# Patient Record
Sex: MALE | Race: WHITE | NOT HISPANIC OR LATINO | Employment: OTHER | URBAN - METROPOLITAN AREA
[De-identification: names, ages, dates, MRNs, and addresses within clinical notes are randomized per-mention and may not be internally consistent; named-entity substitution may affect disease eponyms.]

---

## 2018-02-02 ENCOUNTER — TRANSFERRED RECORDS (OUTPATIENT)
Dept: PHYSICAL THERAPY | Facility: CLINIC | Age: 83
End: 2018-02-02

## 2018-02-16 ENCOUNTER — THERAPY VISIT (OUTPATIENT)
Dept: PHYSICAL THERAPY | Facility: CLINIC | Age: 83
End: 2018-02-16
Payer: COMMERCIAL

## 2018-02-16 DIAGNOSIS — M79.604 PAIN OF RIGHT LOWER EXTREMITY: Primary | ICD-10-CM

## 2018-02-16 PROCEDURE — 97162 PT EVAL MOD COMPLEX 30 MIN: CPT | Mod: GP | Performed by: PHYSICAL THERAPIST

## 2018-02-16 PROCEDURE — 97110 THERAPEUTIC EXERCISES: CPT | Mod: GP | Performed by: PHYSICAL THERAPIST

## 2018-02-16 NOTE — PROGRESS NOTES
[facility] Initial Evaluation  Subjective:  Patient is a 83 year old male presenting with rehab back hpi.   Ciro Wells is a 83 year old male with a lumbar condition.      This is a chronic condition  R leg aching and pain which started 2-3 years ago or longer, pt describes that he had a brain tumor 15 years ago, underwent surgical removal, but afterwards he had difficulty with balance and walking.  Over the past year or so it has started to return and he is struggling with walking and ADL's as a result.       Pt had a brain scan last week which was clear.  His MD feels that since he is struggling with walking due to a separate orthopedic issue that is unrelated to his original brain tumor and would like PT to investigate as to a potential orthopedic origin of his symptoms may be possible.      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  .      Radiates to:  Thigh right and lower leg right.  Pain is described as aching and is intermittent and reported as 6/10.   Pain is the same all the time.  Exacerbated by: walking, or WB on R. Relieved by: sitting and leaning back.  Since onset symptoms are unchanged.  Special tests:  CT scan.      General health as reported by patient is fair.        Current medications:  High blood pressure medication.  Current occupation is Retired  .        Barriers include:  None as reported by the patient.    Red flags:  None as reported by the patient.                        Objective:    Gait:  Shuffling    Gait Type:  Antalgic   Assistive Devices:  None  Deviations:  General Deviations:  Arely decr and stride length decr                                                 Hip Evaluation    Hip Strength:      Extension:  Left: 5-/5  Pain:Right: 4+/5    Pain:                               Knee Evaluation:  ROM:            Strength:     Extension:  Left: 5/5   Pain:       Right: 4+/5   Pain:  Flexion:  Left: 5-/5   Pain:      Right: 5-/5   Pain:    Quad Set Left: Good    Pain:   Quad Set Right: Fair    Pain:  Ligament Testing:  Not Assessed                Special Tests: Not Assessed      Palpation:  Normal      Edema:  Normal              Addie Lumbar Evaluation    Posture:  Sitting: fair  Standing: fair    Lateral Shift: no      Movement Loss:  Flexion (Flex): min  Extension (EXT): min  Side Glide R (SG R): min  Side Glide L (SG L): min  Test Movements:    EIS:   Repeat EIS: During: produces  After: no worse  Mechanical Response: no effect            Conclusion: other  Principle of Treatment:          Other: testing posterior derangement, RoF                                       ROS    Assessment/Plan:    Patient is a 83 year old male with right side hip and right side knee complaints.    Patient has the following significant findings with corresponding treatment plan.                Diagnosis 1:  R thigh pain/weakness      Pain -  hot/cold therapy, manual therapy, self management, education, directional preference and home program  Decreased strength - therapeutic exercise and therapeutic activities  Impaired gait - gait training  Impaired muscle performance - neuro re-education  Decreased function - therapeutic activities    Therapy Evaluation Codes:   1) History comprised of:   Personal factors that impact the plan of care:      Age, Past/current experiences and Time since onset of symptoms.    Comorbidity factors that impact the plan of care are:      Weakness and previous brain tumor.     Medications impacting care: not listed.  2) Examination of Body Systems comprised of:   Body structures and functions that impact the plan of care:      Hip and Knee.   Activity limitations that impact the plan of care are:      Bathing, Bending, Driving, Squatting/kneeling, Stairs, Standing and Walking.  3) Clinical presentation characteristics  are:   Unstable/Unpredictable.  4) Decision-Making    Moderate complexity using standardized patient assessment instrument and/or measureable assessment of functional outcome.  Cumulative Therapy Evaluation is: Moderate complexity.    Previous and current functional limitations:  (See Goal Flow Sheet for this information)    Short term and Long term goals: (See Goal Flow Sheet for this information)     Communication ability:  Patient appears to be able to clearly communicate and understand verbal and written communication and follow directions correctly.  Treatment Explanation - The following has been discussed with the patient:   RX ordered/plan of care  Anticipated outcomes  Possible risks and side effects  This patient would benefit from PT intervention to resume normal activities.   Rehab potential is fair.    Frequency:  1 X week, once daily  Duration:  for 6 weeks  Discharge Plan:  Achieve all LTG.  Independent in home treatment program.  Reach maximal therapeutic benefit.    Please refer to the daily flowsheet for treatment today, total treatment time and time spent performing 1:1 timed codes.

## 2018-02-16 NOTE — LETTER
[facility]  [address]  [address]  [place] [address]  [phone]    2018    Re: Ciro Wells   :   1934  MRN:  [id number]   REFERRING PHYSICIAN:   Maximo Shaffer    [facility]    Date of Initial Evaluation:  2018  Visits:  Rxs Used: 1  Reason for Referral:  Pain of right lower extremity    [facility] Initial Evaluation  Subjective:  Patient is a 83 year old male presenting with rehab back hpi.   Ciro Wells is a 83 year old male with a lumbar condition.      This is a chronic condition  R leg aching and pain which started 2-3 years ago or longer, pt describes that he had a brain tumor 15 years ago, underwent surgical removal, but afterwards he had difficulty with balance and walking.  Over the past year or so it has started to return and he is struggling with walking and ADL's as a result.     Pt had a brain scan last week which was clear.  His MD feels that since he is struggling with walking due to a separate orthopedic issue that is unrelated to his original brain tumor and would like PT to investigate as to a potential orthopedic origin of his symptoms may be possible.    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  .      Radiates to:  Thigh right and lower leg right.  Pain is described as aching and is intermittent and reported as 6/10.   Pain is the same all the time.  Exacerbated by: walking, or WB on R. Relieved by: sitting and leaning back.  Since onset symptoms are unchanged.  Special tests:  CT scan.      General health as reported by patient is fair.        Current medications:  High blood pressure medication.  Current occupation is Retired  Barriers include:  None as reported by the patient.  Red flags:  None as reported by the patient.  Objective:  Gait:  Shuffling  Gait  Type:  Antalgic   Assistive Devices:  None  Deviations:  General Deviations:  Arely decr and stride length decr  Hip Evaluation  Hip Strength:    Extension:  Left: 5-/5  Pain:Right: 4+/5    Pain:    Knee Evaluation:  ROM:    Strength:   Extension:  Left: 5/5   Pain:      Right: 4+/5   Pain:  Flexion:  Left: 5-/5   Pain:      Right: 5-/5   Pain:    Quad Set Left: Good    Pain:   Quad Set Right: Fair    Pain:  Ligament Testing:  Not Assessed  Special Tests: Not Assessed  Palpation:  Normal  Edema:  Normal    Re: Ciro Wells   :   1934      Addie Lumbar Evaluation  Posture:  Sitting: fair  Standing: fair  Lateral Shift: no  Movement Loss:  Flexion (Flex): min  Extension (EXT): min  Side Glide R (SG R): min  Side Glide L (SG L): min  Test Movements:  EIS:   Repeat EIS: During: produces  After: no worse  Mechanical Response: no effect  Conclusion: other  Principle of Treatment:  Other: testing posterior derangement, RoF    Assessment/Plan:    Patient is a 83 year old male with right side hip and right side knee complaints.    Patient has the following significant findings with corresponding treatment plan.                Diagnosis 1:  R thigh pain/weakness      Pain -  hot/cold therapy, manual therapy, self management, education, directional preference and home program  Decreased strength - therapeutic exercise and therapeutic activities  Impaired gait - gait training  Impaired muscle performance - neuro re-education  Decreased function - therapeutic activities    Therapy Evaluation Codes:   1) History comprised of:   Personal factors that impact the plan of care:      Age, Past/current experiences and Time since onset of symptoms.    Comorbidity factors that impact the plan of care are:      Weakness and previous brain tumor.     Medications impacting care: not listed.  2) Examination of Body Systems comprised of:   Body structures and functions that impact the plan of care:      Hip and Knee.   Activity  limitations that impact the plan of care are:      Bathing, Bending, Driving, Squatting/kneeling, Stairs, Standing and Walking.  3) Clinical presentation characteristics are:   Unstable/Unpredictable.  4) Decision-Making    Moderate complexity using standardized patient assessment instrument and/or measureable assessment of functional outcome.  Cumulative Therapy Evaluation is: Moderate complexity.    Previous and current functional limitations:  (See Goal Flow Sheet for this information)    Short term and Long term goals: (See Goal Flow Sheet for this information)             Re: Ciro Wells   :   1934    Communication ability:  Patient appears to be able to clearly communicate and understand verbal and written communication and follow directions correctly.  Treatment Explanation - The following has been discussed with the patient:   RX ordered/plan of care  Anticipated outcomes  Possible risks and side effects  This patient would benefit from PT intervention to resume normal activities.   Rehab potential is fair.    Frequency:  1 X week, once daily  Duration:  for 6 weeks  Discharge Plan:  Achieve all LTG.  Independent in home treatment program.  Reach maximal therapeutic benefit.    Thank you for your referral.    INQUIRIES  Therapist: Emerson Day PT   [facility]  [address] [place] [address]  [address] [place] [id number]  Phone: [phone]  Fax: [phone]

## 2018-03-01 ENCOUNTER — THERAPY VISIT (OUTPATIENT)
Dept: PHYSICAL THERAPY | Facility: CLINIC | Age: 83
End: 2018-03-01
Payer: COMMERCIAL

## 2018-03-01 DIAGNOSIS — M79.604 PAIN OF RIGHT LOWER EXTREMITY: ICD-10-CM

## 2018-03-01 PROCEDURE — 97110 THERAPEUTIC EXERCISES: CPT | Mod: GP | Performed by: PHYSICAL THERAPIST

## 2018-03-01 NOTE — MR AVS SNAPSHOT
"              After Visit Summary   3/1/2018    Ciro Wells    MRN: [id number]           Patient Information     Date Of Birth          11/18/1934        Visit Information        Provider Department      3/1/2018 3:20 PM Aristides Bolaños PT [facility] [place]        Today's Diagnoses     Pain of right lower extremity           Follow-ups after your visit        Your next 10 appointments already scheduled     Mar 08, 2018 10:50 AM CST   GEOVANNI Extremity with Emerson Day PT   [facility] [place] (GEOVANNI [facility]  )    [address]  [address]  [place] [address]   [phone]              Who to contact     If you have questions or need follow up information about today's clinic visit or your schedule please contact [facility] [place] directly at [phone].  Normal or non-critical lab and imaging results will be communicated to you by Getourguidehart, letter or phone within 4 business days after the clinic has received the results. If you do not hear from us within 7 days, please contact the clinic through Getourguidehart or phone. If you have a critical or abnormal lab result, we will notify you by phone as soon as possible.  Submit refill requests through Outcome Referrals or call your pharmacy and they will forward the refill request to us. Please allow 3 business days for your refill to be completed.          Additional Information About Your Visit        Getourguidehart Information     Outcome Referrals lets you send messages to your doctor, view your test results, renew your prescriptions, schedule appointments and more. To sign up, go to [email or web address] . Click on \"Log in\" on the left side of the screen, which will take you to the Welcome page. Then click on \"Sign up Now\" on the right side of the page.     You will be asked to enter the access code listed below, as well as some personal information. Please follow the directions to create your username and password.     Your " access code is: [email or web address]  Expires: 2018  4:00 PM     Your access code will  in 90 days. If you need help or a new code, please call your [facility] or [phone].        Care EveryWhere ID     This is your Care EveryWhere ID. This could be used by other organizations to access your [facility] medical records  [id number]         Blood Pressure from Last 3 Encounters:   No data found for BP    Weight from Last 3 Encounters:   No data found for Wt              We Performed the Following     THERAPEUTIC EXERCISES        Primary Care Provider Office Phone # Fax #    Maximo Granados [phone]       [facility] [address] [address]        Equal Access to Services     ALLEGRA NIETO : Hadii aad ku hadasho Soomaali, waaxda luqadaha, qaybta kaalmada adeegyada, waxay cynthiain haymargaritan elaine bautista . So [facility] [phone].    ATENCIÓN: Si habla español, tiene a rogers disposición servicios gratuitos de asistencia lingüística. [facility] [phone].    We comply with applicable federal civil rights laws and [place] laws. We do not discriminate on the basis of race, color, national origin, age, disability, sex, sexual orientation, or gender identity.            Thank you!     Thank you for choosing [facility]  for your care. Our goal is always to provide you with excellent care. Hearing back from our patients is one way we can continue to improve our services. Please take a few minutes to complete the written survey that you may receive in the mail after your visit with us. Thank you!             Your Updated Medication List - Protect others around you: Learn how to safely use, store and throw away your medicines at www.disposemymeds.org.      Notice  As of 3/1/2018  4:00 PM    You have not been prescribed any medications.

## 2018-04-03 PROBLEM — M79.604 PAIN OF RIGHT LOWER EXTREMITY: Status: RESOLVED | Noted: 2018-02-16 | Resolved: 2018-04-03

## 2018-04-03 NOTE — PROGRESS NOTES
Discharge Note    Progress reporting period is from initial eval to Mar 1, 2018.     Ciro failed to return for next follow up visit and current status is unknown.  Please see information below for last relevant information on current status.  Patient seen for 2 visits.  SUBJECTIVE  Subjective changes noted by patient:  Doing ok.  No better or worse.  Strengthening going well.  Stretching was tough due to balance.   .  Current pain level is 4/10.     Previous pain level was  6/10.   Changes in function:  Yes (See Goal flowsheet attached for changes in current functional level)  Adverse reaction to treatment or activity: None    OBJECTIVE  Changes noted in objective findings: Right hip flexion 4+/5, knee extension 4+/5, flexion 5/5, DF 4/5.      ASSESSMENT/PLAN  Diagnosis: R thigh pain/weakness   DIAGP:  The encounter diagnosis was Pain of right lower extremity.  STG/LTGs have been met or progress has been made towards goals:  Yes, please see goal flowsheet for most current information  Assessment of Progress: current status is unknown.    Last current status:     Self Management Plans:  HEP  I have re-evaluated this patient and find that the nature, scope, duration and intensity of the therapy is appropriate for the medical condition of the patient.  Ciro continues to require the following intervention to meet STG and LTG's:  HEP.    Recommendations:  Discharge with current home program.  Patient to follow up with MD as needed.    Please refer to the daily flowsheet for treatment today, total treatment time and time spent performing 1:1 timed codes.

## 2019-08-13 ENCOUNTER — THERAPY VISIT (OUTPATIENT)
Dept: PHYSICAL THERAPY | Facility: CLINIC | Age: 84
End: 2019-08-13
Payer: MEDICARE

## 2019-08-13 DIAGNOSIS — M25.562 ACUTE PAIN OF LEFT KNEE: Primary | ICD-10-CM

## 2019-08-13 PROCEDURE — 97110 THERAPEUTIC EXERCISES: CPT | Mod: GP | Performed by: PHYSICAL THERAPIST

## 2019-08-13 PROCEDURE — 97161 PT EVAL LOW COMPLEX 20 MIN: CPT | Mod: GP | Performed by: PHYSICAL THERAPIST

## 2019-08-13 ASSESSMENT — ACTIVITIES OF DAILY LIVING (ADL)
RAW_SCORE: 53
SQUAT: ACTIVITY IS NOT DIFFICULT
HOW_WOULD_YOU_RATE_THE_CURRENT_FUNCTION_OF_YOUR_KNEE_DURING_YOUR_USUAL_DAILY_ACTIVITIES_ON_A_SCALE_FROM_0_TO_100_WITH_100_BEING_YOUR_LEVEL_OF_KNEE_FUNCTION_PRIOR_TO_YOUR_INJURY_AND_0_BEING_THE_INABILITY_TO_PERFORM_ANY_OF_YOUR_USUAL_DAILY_ACTIVITIES?: 100
RISE FROM A CHAIR: ACTIVITY IS NOT DIFFICULT
STIFFNESS: I DO NOT HAVE THE SYMPTOM
AS_A_RESULT_OF_YOUR_KNEE_INJURY,_HOW_WOULD_YOU_RATE_YOUR_CURRENT_LEVEL_OF_DAILY_ACTIVITY?: SEVERELY ABNORMAL
SWELLING: I DO NOT HAVE THE SYMPTOM
GO DOWN STAIRS: ACTIVITY IS SOMEWHAT DIFFICULT
LIMPING: THE SYMPTOM PREVENTS ME FROM ALL DAILY ACTIVITIES
KNEEL ON THE FRONT OF YOUR KNEE: ACTIVITY IS NOT DIFFICULT
HOW_WOULD_YOU_RATE_THE_OVERALL_FUNCTION_OF_YOUR_KNEE_DURING_YOUR_USUAL_DAILY_ACTIVITIES?: SEVERELY ABNORMAL
GIVING WAY, BUCKLING OR SHIFTING OF KNEE: I DO NOT HAVE THE SYMPTOM
KNEE_ACTIVITY_OF_DAILY_LIVING_SUM: 53
KNEE_ACTIVITY_OF_DAILY_LIVING_SCORE: 75.71
WALK: ACTIVITY IS VERY DIFFICULT
PAIN: THE SYMPTOM AFFECTS MY ACTIVITY SEVERELY
WEAKNESS: I DO NOT HAVE THE SYMPTOM
SIT WITH YOUR KNEE BENT: ACTIVITY IS NOT DIFFICULT
STAND: ACTIVITY IS NOT DIFFICULT
GO UP STAIRS: ACTIVITY IS SOMEWHAT DIFFICULT

## 2019-08-13 NOTE — PROGRESS NOTES
[facility] Initial Evaluation  Subjective:  The history is provided by the patient. No  was used.   Ciro Wells being seen for L leg pain.   Problem began 7/13/2019. Where condition occurred: at home.Problem occurred: Pt was mowing the lawn, and was turning his mower and felt a pull in his L leg.  Date of MD appt 7/18/2019.  Pt denies any previous L knee issues.  Pt isn't sure if it is related to his spine or leg or if it is truly his knee.    and reported as 10/10 on pain scale. General health as reported by patient is excellent. Pertinent medical history includes:  Cancer and high blood pressure.      Current medications:  High blood pressure medication.     Pain is described as aching and is intermittent. Pain is the same all the time. Since onset symptoms are gradually improving. Special tests:  X-ray.     Patient is computer work of various kinds. Restrictions include:  Working in normal job without restrictions.    Barriers include:  None as reported by patient.  Red flags:  None as reported by patient.  Type of problem:  Left knee        Patient reports pain:  Medial. Radiates to:  Gluteals and thigh. Associated symptoms:  Loss of motion/stiffness. Exacerbated by: walking, twisting. Relieved by: sitting.                      Objective:  System                                           Hip Evaluation    Hip Strength:    Flexion:   Left: 4/5   Pain:  Right: 5-/5   Pain:                    Extension:  Left: 4/5  Pain:Right: 4+/5    Pain:                               Knee Evaluation:  ROM:  AROM: normal            Strength:     Extension:  Left: 4+/5   Pain:      Right: 5-/5   Pain:  Flexion:  Left: 4+/5   Pain:      Right: 4+/5   Pain:    Quad Set Left: Good    Pain:   Quad Set Right: Good    Pain:  Ligament Testing:  Normal                Special Tests:   Left knee positive for the following special tests:  Meniscal          Functional Testing:  : struggles with  balance which makes functional testing difficult.                  General     ROS    Assessment/Plan:    Patient is a 84 year old male with left side knee complaints.    Patient has the following significant findings with corresponding treatment plan.                Diagnosis 1:  L knee/thigh pain      Pain -  hot/cold therapy, manual therapy, self management, education and home program  Decreased strength - therapeutic exercise and therapeutic activities  Impaired muscle performance - neuro re-education  Decreased function - therapeutic activities    Therapy Evaluation Codes:   1) History comprised of:   Personal factors that impact the plan of care:      Age.    Comorbidity factors that impact the plan of care are:      Cancer and High blood pressure.     Medications impacting care: High blood pressure.  2) Examination of Body Systems comprised of:   Body structures and functions that impact the plan of care:      Hip and Knee.   Activity limitations that impact the plan of care are:      Walking.  3) Clinical presentation characteristics are:   Evolving/Changing.  4) Decision-Making    Low complexity using standardized patient assessment instrument and/or measureable assessment of functional outcome.  Cumulative Therapy Evaluation is: Low complexity.    Previous and current functional limitations:  (See Goal Flow Sheet for this information)    Short term and Long term goals: (See Goal Flow Sheet for this information)     Communication ability:  Patient appears to be able to clearly communicate and understand verbal and written communication and follow directions correctly.  Treatment Explanation - The following has been discussed with the patient:   RX ordered/plan of care  Anticipated outcomes  Possible risks and side effects  This patient would benefit from PT intervention to resume normal activities.   Rehab potential is good.    Frequency:  1 X week, once daily  Duration:  for 6 weeks  Discharge Plan:   Achieve all LTG.  Independent in home treatment program.  Reach maximal therapeutic benefit.    Please refer to the daily flowsheet for treatment today, total treatment time and time spent performing 1:1 timed codes.

## 2019-08-13 NOTE — LETTER
DEPARTMENT OF HEALTH AND HUMAN SERVICES  CENTERS FOR MEDICARE & MEDICAID SERVICES    PLAN/UPDATED PLAN OF PROGRESS FOR OUTPATIENT REHABILITATION    PATIENTS NAME:  Ciro Wells   : 1934  PROVID[facility] NUMBER:    [id number]  [id number]  PROVIDER NAME: [employer] [facility]  MEDICAL RECORD NUMBER: [id number]   START OF CARE DATE:  SOC Date: 19   TYPE:  PT  PRIMARY/TREATMENT DIAGNOSIS: (Pertinent Medical Diagnosis)  Acute pain of left knee  VISITS FROM START OF CARE: 1 Rxs Used: 1     [place] [facility] Initial Evaluation    Subjective:  The history is provided by the patient. No  was used.   Ciro Wells being seen for L leg pain.   Problem began 2019. Where condition occurred: at home.Problem occurred: Pt was mowing the lawn, and was turning his mower and felt a pull in his L leg.  Date of MD appt 2019.  Pt denies any previous L knee issues.  Pt isn't sure if it is related to his spine or leg or if it is truly his knee.    and reported as 10/10 on pain scale. General health as reported by patient is excellent. Pertinent medical history includes:  Cancer and high blood pressure.      Current medications:  High blood pressure medication.     Pain is described as aching and is intermittent. Pain is the same all the time. Since onset symptoms are gradually improving. Special tests:  X-ray.     Patient is computer work of various kinds. Restrictions include:  Working in normal job without restrictions.  Barriers include:  None as reported by patient.  Red flags:  None as reported by patient.  Type of problem:  Left knee  Patient reports pain:  Medial. Radiates to:  Gluteals and thigh. Associated symptoms:  Loss of motion/stiffness. Exacerbated by: walking, twisting. Relieved by: sitting.  Objective:  System  Hip Evaluation  Hip Strength:    Flexion:   Left: 4/5   Pain:  Right: 5-/5   Pain:  Extension:  Left: 4/5  Pain:Right: 4+/5    Pain:     Knee Evaluation:  ROM:  AROM: normal    Strength:   Extension:  Left: 4+/5   Pain:      Right: 5-/5   Pain:  Flexion:  Left: 4+/5   Pain:      Right: 4+/5   Pain:    Quad Set Left: Good    Pain:   Quad Set Right: Good    Pain:  Ligament Testing:  Normal  Special Tests:   Left knee positive for the following special tests:  Meniscal  Functional Testing:  : struggles with balance which makes functional testing difficult.  Assessment/Plan:    Patient is a 84 year old male with left side knee complaints.    PATIENTS NAME:  Ciro Wells   : 1934    Patient has the following significant findings with corresponding treatment plan.                Diagnosis 1:  L knee/thigh pain  Pain -  hot/cold therapy, manual therapy, self management, education and home program  Decreased strength - therapeutic exercise and therapeutic activities  Impaired muscle performance - neuro re-education  Decreased function - therapeutic activities  Therapy Evaluation Codes:   1) History comprised of:   Personal factors that impact the plan of care:      Age.    Comorbidity factors that impact the plan of care are:      Cancer and High blood pressure.     Medications impacting care: High blood pressure.  2) Examination of Body Systems comprised of:   Body structures and functions that impact the plan of care:      Hip and Knee.   Activity limitations that impact the plan of care are:      Walking.  3) Clinical presentation characteristics are:   Evolving/Changing.  4) Decision-Making    Low complexity using standardized patient assessment instrument and/or measureable assessment of functional outcome.  Cumulative Therapy Evaluation is: Low complexity.  Previous and current functional limitations:  (See Goal Flow Sheet for this information)    Short term and Long term goals: (See Goal Flow Sheet for this information)   Communication ability:  Patient appears to be able to clearly communicate and understand verbal and written  "communication and follow directions correctly.  Treatment Explanation - The following has been discussed with the patient:   RX ordered/plan of care  Anticipated outcomes  Possible risks and side effects  This patient would benefit from PT intervention to resume normal activities.   Rehab potential is good.  Frequency:  1 X week, once daily  Duration:  for 6 weeks  Discharge Plan:  Achieve all LTG.  Independent in home treatment program.  Reach maximal therapeutic benefit.                        PATIENTS NAME:  Ciro Wells   : 1934          Caregiver Signature/Credentials _____________________________ Date ________       CLAUS PinedaT, cert MDT   I have reviewed and certified the need for these services and plan of treatment while under my care.        PHYSICIAN'S SIGNATURE:   _____________________________________ Date___________                            Marimar Martínez MD    Certification period:  Beginning of Cert date period: 19 to  End of Cert period date: 10/11/19   Functional Level Progress Report: Please see attached \"Goal Flow sheet for Functional level.\"  ____X____ Continue Services or       ________ DC Services              Service dates: From  SOC Date: 19 date to present                         "

## 2019-10-11 PROBLEM — M25.562 ACUTE PAIN OF LEFT KNEE: Status: RESOLVED | Noted: 2019-08-13 | Resolved: 2019-10-11

## 2022-03-02 ENCOUNTER — LAB REQUISITION (OUTPATIENT)
Dept: LAB | Facility: CLINIC | Age: 87
End: 2022-03-02
Payer: COMMERCIAL

## 2022-03-02 DIAGNOSIS — Z11.1 ENCOUNTER FOR SCREENING FOR RESPIRATORY TUBERCULOSIS: ICD-10-CM

## 2022-03-02 PROCEDURE — U0005 INFEC AGEN DETEC AMPLI PROBE: HCPCS | Mod: ORL | Performed by: NURSE PRACTITIONER

## 2022-03-03 ENCOUNTER — LAB REQUISITION (OUTPATIENT)
Dept: LAB | Facility: CLINIC | Age: 87
End: 2022-03-03
Payer: COMMERCIAL

## 2022-03-03 ENCOUNTER — TRANSITIONAL CARE UNIT VISIT (OUTPATIENT)
Dept: GERIATRICS | Facility: CLINIC | Age: 87
End: 2022-03-03
Payer: COMMERCIAL

## 2022-03-03 VITALS
SYSTOLIC BLOOD PRESSURE: 138 MMHG | TEMPERATURE: 97.9 F | HEART RATE: 88 BPM | BODY MASS INDEX: 31.94 KG/M2 | HEIGHT: 73 IN | WEIGHT: 241 LBS | OXYGEN SATURATION: 94 % | RESPIRATION RATE: 20 BRPM | DIASTOLIC BLOOD PRESSURE: 66 MMHG

## 2022-03-03 VITALS
RESPIRATION RATE: 18 BRPM | TEMPERATURE: 98.3 F | SYSTOLIC BLOOD PRESSURE: 138 MMHG | DIASTOLIC BLOOD PRESSURE: 74 MMHG | WEIGHT: 241 LBS | HEART RATE: 63 BPM | OXYGEN SATURATION: 95 %

## 2022-03-03 DIAGNOSIS — U07.1 COVID-19: ICD-10-CM

## 2022-03-03 DIAGNOSIS — J96.01 ACUTE RESPIRATORY FAILURE WITH HYPOXIA (H): ICD-10-CM

## 2022-03-03 DIAGNOSIS — S72.001D CLOSED FRACTURE OF RIGHT HIP WITH ROUTINE HEALING, SUBSEQUENT ENCOUNTER: ICD-10-CM

## 2022-03-03 DIAGNOSIS — D64.9 POSTOPERATIVE ANEMIA: ICD-10-CM

## 2022-03-03 DIAGNOSIS — I49.8 ATRIAL ARRHYTHMIA: ICD-10-CM

## 2022-03-03 DIAGNOSIS — I50.21 ACUTE HFREF (HEART FAILURE WITH REDUCED EJECTION FRACTION) (H): ICD-10-CM

## 2022-03-03 DIAGNOSIS — I10 ESSENTIAL HYPERTENSION: ICD-10-CM

## 2022-03-03 DIAGNOSIS — Z86.718 HISTORY OF DEEP VENOUS THROMBOSIS: ICD-10-CM

## 2022-03-03 DIAGNOSIS — I42.9 CARDIOMYOPATHY, UNSPECIFIED TYPE (H): ICD-10-CM

## 2022-03-03 DIAGNOSIS — J69.0 ASPIRATION PNEUMONIA, UNSPECIFIED ASPIRATION PNEUMONIA TYPE, UNSPECIFIED LATERALITY, UNSPECIFIED PART OF LUNG (H): ICD-10-CM

## 2022-03-03 DIAGNOSIS — I27.20 PULMONARY HYPERTENSION (H): ICD-10-CM

## 2022-03-03 DIAGNOSIS — I21.4 NSTEMI (NON-ST ELEVATED MYOCARDIAL INFARCTION) (H): Primary | ICD-10-CM

## 2022-03-03 DIAGNOSIS — R53.81 PHYSICAL DECONDITIONING: ICD-10-CM

## 2022-03-03 DIAGNOSIS — N18.30 STAGE 3 CHRONIC KIDNEY DISEASE, UNSPECIFIED WHETHER STAGE 3A OR 3B CKD (H): ICD-10-CM

## 2022-03-03 DIAGNOSIS — I25.10 CORONARY ARTERY DISEASE INVOLVING NATIVE CORONARY ARTERY OF NATIVE HEART WITHOUT ANGINA PECTORIS: ICD-10-CM

## 2022-03-03 DIAGNOSIS — Z79.01 CHRONIC ANTICOAGULATION: ICD-10-CM

## 2022-03-03 PROCEDURE — 99310 SBSQ NF CARE HIGH MDM 45: CPT | Performed by: NURSE PRACTITIONER

## 2022-03-03 PROCEDURE — 36415 COLL VENOUS BLD VENIPUNCTURE: CPT | Mod: ORL | Performed by: NURSE PRACTITIONER

## 2022-03-03 PROCEDURE — U0005 INFEC AGEN DETEC AMPLI PROBE: HCPCS | Performed by: NURSE PRACTITIONER

## 2022-03-03 PROCEDURE — 86481 TB AG RESPONSE T-CELL SUSP: CPT | Mod: ORL | Performed by: NURSE PRACTITIONER

## 2022-03-03 RX ORDER — WARFARIN SODIUM 5 MG/1
5 TABLET ORAL AT BEDTIME
COMMUNITY
Start: 2021-06-21 | End: 2022-03-07

## 2022-03-03 RX ORDER — FUROSEMIDE 20 MG
40 TABLET ORAL DAILY
COMMUNITY
Start: 2022-03-01 | End: 2023-03-01

## 2022-03-03 RX ORDER — TRIAMCINOLONE ACETONIDE 1 MG/G
CREAM TOPICAL SEE ADMIN INSTRUCTIONS
COMMUNITY
Start: 2020-06-29 | End: 2023-02-22

## 2022-03-03 RX ORDER — METOPROLOL SUCCINATE 100 MG/1
50 TABLET, EXTENDED RELEASE ORAL DAILY
COMMUNITY
Start: 2022-03-02 | End: 2023-02-22

## 2022-03-03 RX ORDER — ACETAMINOPHEN 325 MG/1
650 TABLET ORAL EVERY 4 HOURS PRN
COMMUNITY
Start: 2021-08-12 | End: 2023-02-22

## 2022-03-03 NOTE — PATIENT INSTRUCTIONS
Orders  Ciro Wells  11/18/1934  1) Patient should follow up with ortho as scheduled on 3/7- please ensure details are entered in PCC  2) Patient also to follow up with cardiology- Dr Childs through [facility] system. Please add to PCC and help schedule  3) Please ensure patient has code status added. Discharge orders state he is full code  4) BMP, CBC 3/4. Diagnosis: anemia, CKD3  5) IS 10 reps QID with assist.  6) Daily weight. Update provider if weight gain >2 lb in 1 day, >5 lb in 1 week.  LORRAINE Che CNP on 3/3/2022 at 2:58 PM

## 2022-03-03 NOTE — LETTER
3/3/2022        RE: Ciro Wells  [address]  [place] [address]        [facility]    PRIMARY CARE PROVIDER AND CLINIC:  Maximo Granados MD, [facility] [address] [facility] [address]  Chief Complaint   Patient presents with     Hospital F/U      [facility] Record Number:  [id number]  Place of Service where encounter took place:  [facility]  ([facility]) [[id number]]    Ciro Wells  is a 87 year old  (11/18/1934), admitted to the above facility from  [facility] . Hospital stay 2/19/22 through 3/2/22..   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    Summary of recent hospitalization:  Ciro Wells was hospitalized at [facility] from February 19 through March 2, 2022.  He presented to the emergency department via EMS after he was found down on the ground in his garage for about 5 hours. Upon presentation to ED he was found to have elevated WBC 19.7, elevated lactate 2.4, tachycardia 120-130s on admission, elevated troponin 1.42-->2.51-->30.92. BNP elevated to 2534 on 2/21. CTA of chest revealed no PE, interlobular septal thickening and groundglass alveolar opacity affecting the right lung much more than the left. Trace right pleural effusion. Findings are likely on the basis of asymmetric edema and possible superimposed aspiration pneumonitis on the right. Step and legionella urine AG were negative. Blood cultures were negative. He was treated for sepsis secondary to aspiration pneumonia with ceftriaxone and azithromycin. He did require oxygen which was weaned off prior to hospital discharge. Cardiology consulted on admission due to elevated troponin, tachycardia. ECHO 2/20/22 showed EF severely reduced to 25%, cannot comment on WMA, mildly increased left ventricular  "wall thickness, RV is suboptimally visualized (grossly normal size with reduced function, left atrial chamber dimension is mildly enlarged, trace mitral valve regurgitation, moderate tricuspid valve regurgitation, Mild pulmonary hypertension, proximal ascending aorta is borderline dilated. Repeat ECHO 2/2022 with improved EF 40-45%, which is improved from previous, distal inferior and apical walls hypokinetic, which has has improved from previous. Underwent coronary angiogram 2/22 that showed Minimal non-obstructive CAD, Possible stress cardiomyopathy variant, type II NSTEMI in setting of recent fall / hip fracture. Started on metoprolol ER, lasix dose was adjusted. PTA lisinopril and chlorthalidone were discontinued. Follow up with cardiology outpatient. Ortho was also consulted on admission. Xray of pelvis revealed \"Oblique impacted fracture involving the right femoral neck. The right femoral shaft is retracted proximally and impacted upon the right femoral neck. No dislocation of the femoral/acetabular articulation which demonstrates moderate degenerative change. Moderate to degenerative changes left hip. Degenerative changes both SI joints. Surgical clips in the pelvis\" and he is status post posterior hip placement on February 21. He was started on vitamin d supplementation (vitamin d level low at 24). Should follow up with ortho on 3/7 for incision check. Discharged to TCU for physical rehabilitation and medical management.     Quinten was seen today sitting up in wheelchair in his room. Reports no pain at rest. Pain increases to 5/10 with movement and therapy. We discussed scheduling tylenol and at this time declines this. He is hoping to discharge home soon. Therapy reports he is currently assist of 2 for transfers, cares. He denies any SOB, cough, CP, dizziness. Reports history of swelling to left leg> right. Has some edema today. He had many questions about his medications from hospital stay and we discussed " this and I asked nursing to give him a copy of his medication list. He reports bowels moving well. Appetite is ok. Denies dysuria, trouble voiding. We discussed what to expect at TCU and I encouraged him to continue to work with therapy to ensure he is safe and successful when he discharges home. INR today elevated to 4.9.    CODE STATUS/ADVANCE DIRECTIVES DISCUSSION:  Full Code  CPR/Full code   ALLERGIES:   Allergies   Allergen Reactions     Other Environmental Allergy      Pollen, Dust      PAST MEDICAL HISTORY: No past medical history on file.   PAST SURGICAL HISTORY:   has no past surgical history on file.  FAMILY HISTORY: family history is not on file.  SOCIAL HISTORY:     Patient's living condition: lives with spouse    Post Discharge Medication Reconciliation Status: discharge medications reconciled and changed, per note/orders  Current Outpatient Medications   Medication Sig     acetaminophen (TYLENOL) 325 MG tablet Take 650 mg by mouth every 4 hours as needed     cholecalciferol 25 MCG (1000 UT) TABS Take 1,000 Units by mouth daily     furosemide (LASIX) 20 MG tablet Take 20 mg by mouth daily     metoprolol succinate ER (TOPROL-XL) 100 MG 24 hr tablet Take 50 mg by mouth daily     Multiple Vitamins-Minerals (CENTRUM SILVER 50+MEN PO) Take 1 tablet by mouth daily     triamcinolone (KENALOG) 0.1 % external cream Apply topically See Admin Instructions Apply to calves topically as needed for dermalitis apply to scaly pink areas on calves 1-2 x daily until smooth as needed     warfarin ANTICOAGULANT (COUMADIN) 5 MG tablet Take 5 mg by mouth At Bedtime     No current facility-administered medications for this visit.       ROS:  10 point ROS of systems including Constitutional, Eyes, Respiratory, Cardiovascular, Gastroenterology, Genitourinary, Integumentary, Musculoskeletal, Psychiatric were all negative except for pertinent positives noted in my HPI.    Vitals:  /74   Pulse 63   Temp 98.3  F (36.8  C)    Resp 18   Wt 109.3 kg (241 lb)   SpO2 95%   Exam:  GENERAL APPEARANCE:  Alert, in NAD  HEENT: normocephalic, moist mucous membranes, nose without drainage or crusting  RESP:  respiratory effort normal, no respiratory distress, Lung sounds clear, patient is on RA  CV: auscultation of heart done, rate and rhythm irregular.  ABDOMEN: + bowel sounds, soft, nontender, no grimacing or guarding with palpation.  M/S:+1 edema to RLE, generalized edema to LLE; good sensation to feet, + pedal pulses  SKIN:  Inspection and palpation of skin and subcutaneous tissue: skin warm, dry without rashes  NEURO: cranial nerves 2-12 grossly intact and at patient's baseline; moves extremities freely  PSYCH: oriented x 3, affect and mood normal    Lab/Diagnostic data:  Labs done in SNF are in [facility]. Please refer to them using [facility]Care Everywhere. and Recent labs in EPIC reviewed by me today.     ASSESSMENT/PLAN:  (I21.4) NSTEMI (non-ST elevated myocardial infarction) (H)  (primary encounter diagnosis)  (I50.21) Acute HFrEF (heart failure with reduced ejection fraction) (H)  (I42.9) Cardiomyopathy, unspecified type (H)  (I27.20) Pulmonary hypertension (H)  (I25.10) Coronary artery disease involving native coronary artery of native heart without angina pectoris  (I10) Essential hypertension  Comment: Found to have elevated troponin on admission to hospital elevated troponin 1.42-->2.51-->30.92  -cardiology consulted   -ECHO 2/20/22 showed EF severely reduced to 25%, cannot comment on WMA, mildly increased left ventricular wall thickness, RV is suboptimally visualized (grossly normal size with reduced function, left atrial chamber dimension is mildly enlarged, trace mitral valve regurgitation, moderate tricuspid valve regurgitation, Mild pulmonary hypertension, proximal ascending aorta is borderline dilated.   -Repeat ECHO 2/22/2022 with improved EF 40-45%, which is improved from previous, distal inferior and apical walls  hypokinetic, which has has improved from previous.   -Underwent coronary angiogram 2/22 that showed Minimal non-obstructive CAD, Possible stress cardiomyopathy variant, type II NSTEMI in setting of recent fall / hip fracture.   -denies CP today.   -appears euvolemic- some edema to legs that is chronic and not worse than normal and denies respiratory symptoms  -BP controlled 138/66, 138/74, 141/78  -PTA lisinopril and chlorthalidone discontinued in hospital, started on metoprolol ER and lasix  -was not started on aspirin or statin during hospitalization  Plan: Continue lasix 20 mg daily, metoprolol ER 50 mg daily. BMP 3/4. TG shapes on in AM, off in PM. VS per policy. Adjust medications as needed. Daily weights. Notify provider with weight gain >2 lbs in a day, >5 lbs in on week. 2 gram Na diet. Follow up with cardiology Dr. Childs through [facility]-asked facility to schedule.     (I49.8) Atrial arrhythmia s/p ablation 8/30/2019  (Z86.718) History of deep venous thrombosis  History of PE  (Z79.01) Chronic anticoagulation  Comment: HR tachycardic on admission to hospital  -CTA on admission negative for PE  -metoprolol XL started in hospital  -INR supratherapeutic today 4.9  -HR at TCU controlled 63, 93, 89  Plan: Continue metoprolol ER 50 mg daily.VS per policy. Adjust medications as needed. Follow up with cardiology.    (J69.0) Aspiration pneumonia, unspecified aspiration pneumonia type, unspecified laterality, unspecified part of lung (H)  (J96.01) Acute respiratory failure with hypoxia (H)  Comment: found to be septic on admission to hospital WBC 19.7, elevated lactate 2.4.  -CTA of chest revealed no PE, interlobular septal thickening and groundglass alveolar opacity affecting the right lung much more than the left. Trace right pleural effusion. Findings are likely on the basis of asymmetric edema and possible superimposed aspiration pneumonitis on the right.   -Step and legionella urine AG were negative.   -Blood  cultures were negative.   -He was treated for sepsis secondary to aspiration pneumonia with ceftriaxone and azithromycin.   -Oxygen was weaned off during hospitalization  -is afebrile and hemodynamically stable. Denies SOB, cough, coughing while eating  Plan: IS 10 reps QID with assist. Monitor respiratory status.    (N18.30) Stage 3 chronic kidney disease, unspecified whether stage 3a or 3b CKD (H)  Comment: with KEM during hospitalization suspected to be secondary to intraoperative hypotension, contrast from CTA, possible congestion from CHF  -creatinine improving at hospital discharge was 1.28 on 2/27  -baseline creatinine around 1.2  Plan: BMP tomorrow. Avoid nephrotoxins. Renally dose medications as indicated.    (S72.001D) Closed fracture of right hip with routine healing, subsequent encounter  Comment: s/p posterior hip placement on February 21  -pain currently managed   Plan: Continue tylenol PRN, oxycodone PRN. Continue warfarin for DVT prophy. Continue vitamin d supplementation. WBAT. Therapy as below. Follow up with ortho on 3/7. Follow up with osteoporosis clinic outpatient.     Post op anemia  Comment: Hgb at last check on 2/24 was 8.1, down for admission hgb of 13.5  -no s.sx of bleeding today  Plan: CBC 3/4.     (R53.81) Physical deconditioning  Comment: Acute, secondary to recent hospitalization, medical conditions as above  -patient hoping to discharge home soon. Discussed with therapy today and patient requiring assist of 2 for cares, transfers.   Plan: Encourage participation in physical therapy/occupational therapy for strengthening and deconditioning. Discharge planning per their recommendation. Social work to assist with d/c planning.        Total time spent with patient visit at the skilled nursing facility was 43 minutes including patient visit and review of past records. Greater than 50% of total time spent with counseling and coordinating care due to coordinating care with facility staff  regarding admission orders, medication orders, plan of care as described above. Counseling patient: current medications, plan of care and what to expect at TCU, potential discharge plan  .     Electronically signed by:  LORRAINE Che CNP                       Sincerely,        LORRAINE Che CNP

## 2022-03-03 NOTE — PROGRESS NOTES
[facility] GERIATRICS    PRIMARY CARE PROVIDER AND CLINIC:  Maximo Granados MD, [address] [address]  Chief Complaint   Patient presents with     Hospital F/U      [place] Medical Record Number:  [id number]  Place of Service where encounter took place:  [facility]  ([facility]) [[id number]]    Ciro Wells  is a 87 year old  (11/18/1934), admitted to the above facility from  [facility] . Hospital stay 2/19/22 through 3/2/22..   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    Summary of recent hospitalization:  Ciro Wells was hospitalized at [facility] from February 19 through March 2, 2022.  He presented to the emergency department via EMS after he was found down on the ground in his garage for about 5 hours. Upon presentation to ED he was found to have elevated WBC 19.7, elevated lactate 2.4, tachycardia 120-130s on admission, elevated troponin 1.42-->2.51-->30.92. BNP elevated to 2534 on 2/21. CTA of chest revealed no PE, interlobular septal thickening and groundglass alveolar opacity affecting the right lung much more than the left. Trace right pleural effusion. Findings are likely on the basis of asymmetric edema and possible superimposed aspiration pneumonitis on the right. Step and legionella urine AG were negative. Blood cultures were negative. He was treated for sepsis secondary to aspiration pneumonia with ceftriaxone and azithromycin. He did require oxygen which was weaned off prior to hospital discharge. Cardiology consulted on admission due to elevated troponin, tachycardia. ECHO 2/20/22 showed EF severely reduced to 25%, cannot comment on WMA, mildly increased left ventricular wall thickness, RV is suboptimally visualized (grossly normal size with reduced  "function, left atrial chamber dimension is mildly enlarged, trace mitral valve regurgitation, moderate tricuspid valve regurgitation, Mild pulmonary hypertension, proximal ascending aorta is borderline dilated. Repeat ECHO 2/2022 with improved EF 40-45%, which is improved from previous, distal inferior and apical walls hypokinetic, which has has improved from previous. Underwent coronary angiogram 2/22 that showed Minimal non-obstructive CAD, Possible stress cardiomyopathy variant, type II NSTEMI in setting of recent fall / hip fracture. Started on metoprolol ER, lasix dose was adjusted. PTA lisinopril and chlorthalidone were discontinued. Follow up with cardiology outpatient. Ortho was also consulted on admission. Xray of pelvis revealed \"Oblique impacted fracture involving the right femoral neck. The right femoral shaft is retracted proximally and impacted upon the right femoral neck. No dislocation of the femoral/acetabular articulation which demonstrates moderate degenerative change. Moderate to degenerative changes left hip. Degenerative changes both SI joints. Surgical clips in the pelvis\" and he is status post posterior hip placement on February 21. He was started on vitamin d supplementation (vitamin d level low at 24). Should follow up with ortho on 3/7 for incision check. Discharged to TCU for physical rehabilitation and medical management.     Quinten was seen today sitting up in wheelchair in his room. Reports no pain at rest. Pain increases to 5/10 with movement and therapy. We discussed scheduling tylenol and at this time declines this. He is hoping to discharge home soon. Therapy reports he is currently assist of 2 for transfers, cares. He denies any SOB, cough, CP, dizziness. Reports history of swelling to left leg> right. Has some edema today. He had many questions about his medications from hospital stay and we discussed this and I asked nursing to give him a copy of his medication list. He reports " bowels moving well. Appetite is ok. Denies dysuria, trouble voiding. We discussed what to expect at TCU and I encouraged him to continue to work with therapy to ensure he is safe and successful when he discharges home. INR today elevated to 4.9.    CODE STATUS/ADVANCE DIRECTIVES DISCUSSION:  Full Code  CPR/Full code   ALLERGIES:   Allergies   Allergen Reactions     Other Environmental Allergy      Pollen, Dust      PAST MEDICAL HISTORY: No past medical history on file.   PAST SURGICAL HISTORY:   has no past surgical history on file.  FAMILY HISTORY: family history is not on file.  SOCIAL HISTORY:     Patient's living condition: lives with spouse    Post Discharge Medication Reconciliation Status: discharge medications reconciled and changed, per note/orders  Current Outpatient Medications   Medication Sig     acetaminophen (TYLENOL) 325 MG tablet Take 650 mg by mouth every 4 hours as needed     cholecalciferol 25 MCG (1000 UT) TABS Take 1,000 Units by mouth daily     furosemide (LASIX) 20 MG tablet Take 20 mg by mouth daily     metoprolol succinate ER (TOPROL-XL) 100 MG 24 hr tablet Take 50 mg by mouth daily     Multiple Vitamins-Minerals (CENTRUM SILVER 50+MEN PO) Take 1 tablet by mouth daily     triamcinolone (KENALOG) 0.1 % external cream Apply topically See Admin Instructions Apply to calves topically as needed for dermalitis apply to scaly pink areas on calves 1-2 x daily until smooth as needed     warfarin ANTICOAGULANT (COUMADIN) 5 MG tablet Take 5 mg by mouth At Bedtime     No current facility-administered medications for this visit.       ROS:  10 point ROS of systems including Constitutional, Eyes, Respiratory, Cardiovascular, Gastroenterology, Genitourinary, Integumentary, Musculoskeletal, Psychiatric were all negative except for pertinent positives noted in my HPI.    Vitals:  /74   Pulse 63   Temp 98.3  F (36.8  C)   Resp 18   Wt 109.3 kg (241 lb)   SpO2 95%   Exam:  GENERAL APPEARANCE:   Alert, in NAD  HEENT: normocephalic, moist mucous membranes, nose without drainage or crusting  RESP:  respiratory effort normal, no respiratory distress, Lung sounds clear, patient is on RA  CV: auscultation of heart done, rate and rhythm irregular.  ABDOMEN: + bowel sounds, soft, nontender, no grimacing or guarding with palpation.  M/S:+1 edema to RLE, generalized edema to LLE; good sensation to feet, + pedal pulses  SKIN:  Inspection and palpation of skin and subcutaneous tissue: skin warm, dry without rashes  NEURO: cranial nerves 2-12 grossly intact and at patient's baseline; moves extremities freely  PSYCH: oriented x 3, affect and mood normal    Lab/Diagnostic data:  Labs done in SNF are in [place] [employer]. Please refer to them using [employer] Everywhere. and Recent labs in EPIC reviewed by me today.     ASSESSMENT/PLAN:  (I21.4) NSTEMI (non-ST elevated myocardial infarction) (H)  (primary encounter diagnosis)  (I50.21) Acute HFrEF (heart failure with reduced ejection fraction) (H)  (I42.9) Cardiomyopathy, unspecified type (H)  (I27.20) Pulmonary hypertension (H)  (I25.10) Coronary artery disease involving native coronary artery of native heart without angina pectoris  (I10) Essential hypertension  Comment: Found to have elevated troponin on admission to hospital elevated troponin 1.42-->2.51-->30.92  -cardiology consulted   -ECHO 2/20/22 showed EF severely reduced to 25%, cannot comment on WMA, mildly increased left ventricular wall thickness, RV is suboptimally visualized (grossly normal size with reduced function, left atrial chamber dimension is mildly enlarged, trace mitral valve regurgitation, moderate tricuspid valve regurgitation, Mild pulmonary hypertension, proximal ascending aorta is borderline dilated.   -Repeat ECHO 2/22/2022 with improved EF 40-45%, which is improved from previous, distal inferior and apical walls hypokinetic, which has has improved from previous.   -Underwent coronary angiogram  2/22 that showed Minimal non-obstructive CAD, Possible stress cardiomyopathy variant, type II NSTEMI in setting of recent fall / hip fracture.   -denies CP today.   -appears euvolemic- some edema to legs that is chronic and not worse than normal and denies respiratory symptoms  -BP controlled 138/66, 138/74, 141/78  -PTA lisinopril and chlorthalidone discontinued in hospital, started on metoprolol ER and lasix  -was not started on aspirin or statin during hospitalization  Plan: Continue lasix 20 mg daily, metoprolol ER 50 mg daily. BMP 3/4. TG shapes on in AM, off in PM. VS per policy. Adjust medications as needed. Daily weights. Notify provider with weight gain >2 lbs in a day, >5 lbs in on week. 2 gram Na diet. Follow up with cardiology Dr. Childs through [facility]-asked facility to schedule.     (I49.8) Atrial arrhythmia s/p ablation 8/30/2019  (Z86.718) History of deep venous thrombosis  History of PE  (Z79.01) Chronic anticoagulation  Comment: HR tachycardic on admission to hospital  -CTA on admission negative for PE  -metoprolol XL started in hospital  -INR supratherapeutic today 4.9  -HR at TCU controlled 63, 93, 89  Plan: Continue metoprolol ER 50 mg daily.VS per policy. Adjust medications as needed. Follow up with cardiology.    (J69.0) Aspiration pneumonia, unspecified aspiration pneumonia type, unspecified laterality, unspecified part of lung (H)  (J96.01) Acute respiratory failure with hypoxia (H)  Comment: found to be septic on admission to hospital WBC 19.7, elevated lactate 2.4.  -CTA of chest revealed no PE, interlobular septal thickening and groundglass alveolar opacity affecting the right lung much more than the left. Trace right pleural effusion. Findings are likely on the basis of asymmetric edema and possible superimposed aspiration pneumonitis on the right.   -Step and legionella urine AG were negative.   -Blood cultures were negative.   -He was treated for sepsis secondary to aspiration pneumonia  with ceftriaxone and azithromycin.   -Oxygen was weaned off during hospitalization  -is afebrile and hemodynamically stable. Denies SOB, cough, coughing while eating  Plan: IS 10 reps QID with assist. Monitor respiratory status.    (N18.30) Stage 3 chronic kidney disease, unspecified whether stage 3a or 3b CKD (H)  Comment: with KEM during hospitalization suspected to be secondary to intraoperative hypotension, contrast from CTA, possible congestion from CHF  -creatinine improving at hospital discharge was 1.28 on 2/27  -baseline creatinine around 1.2  Plan: BMP tomorrow. Avoid nephrotoxins. Renally dose medications as indicated.    (S72.001D) Closed fracture of right hip with routine healing, subsequent encounter  Comment: s/p posterior hip placement on February 21  -pain currently managed   Plan: Continue tylenol PRN, oxycodone PRN. Continue warfarin for DVT prophy. Continue vitamin d supplementation. WBAT. Therapy as below. Follow up with ortho on 3/7. Follow up with osteoporosis clinic outpatient.     Post op anemia  Comment: Hgb at last check on 2/24 was 8.1, down for admission hgb of 13.5  -no s.sx of bleeding today  Plan: CBC 3/4.     (R53.81) Physical deconditioning  Comment: Acute, secondary to recent hospitalization, medical conditions as above  -patient hoping to discharge home soon. Discussed with therapy today and patient requiring assist of 2 for cares, transfers.   Plan: Encourage participation in physical therapy/occupational therapy for strengthening and deconditioning. Discharge planning per their recommendation. Social work to assist with d/c planning.        Total time spent with patient visit at the skilled nursing facility was 43 minutes including patient visit and review of past records. Greater than 50% of total time spent with counseling and coordinating care due to coordinating care with facility staff regarding admission orders, medication orders, plan of care as described above.  Counseling patient: current medications, plan of care and what to expect at TCU, potential discharge plan  .     Electronically signed by:  LORRAINE Che CNP

## 2022-03-03 NOTE — PROGRESS NOTES
"[facility] GERIATRICS  [place] Medical Record Number:  [id number]  Place of Service where encounter took place: [facility]  ([facility]) [[id number]]    HPI:    Ciro Wells is a 87 year old  (11/18/1934), who is being seen today for an episodic care visit at the above location. Today's concern is INR/Coumadin management for atrial flutter, history of PE, DVT    ROS/Subjective:  Bleeding Signs/Symptoms:  None  Thromboembolic Signs/Symptoms:  None  Medication Changes: Yes, medications adjusted during recent hospitalization  Dietary Changes:  Yes now in TCU  Activity Changes: Yes: now in TCU  Bacterial/Viral Infection:  recent sepsis secondary to aspiration pnuemonia, completed antibiotics in the hospital  Missed Coumadin Doses:  INR elevated yesterday held at minimum 3/2 and 3/3  On ASA: No  Other Concerns:  Patient requesting discharge home today. Spoke to therapy who reports patient is assist of 2 for transfers, ambulating 15 feet with assist of 2, not safe for discharge home.    OBJECTIVE:  /66   Pulse 88   Temp 97.9  F (36.6  C)   Resp 20   Ht 1.854 m (6' 0.99\")   Wt 109.3 kg (241 lb)   SpO2 94%   BMI 31.80 kg/m    Last INR: 4.9 on 3/3/22  INR Today:  3.2  Current Dose:  Coumadin held, PTA dosing was 5 mg daily per hospital discharge orders  INR Flow sheet at SNF:    ASSESSMENT:  1. Encounter for therapeutic drug monitoring    2. Long term (current) use of anticoagulants    3. Atrial flutter, unspecified type (H)    4. History of deep venous thrombosis    5. History of pulmonary embolism      Supratherapeutic INR for goal of 2-3    PLAN/ORDERS:     New Dose: Coumadin 2 mg today    Next INR: INR tomorrow      Discussed patient wanting to discharge home with therapy as above who states discharge at this time is not safe due to requiring assist of 2. Update  who will reach out to family to discuss.     Electronically signed by:  LORRAINE Che CNP   "

## 2022-03-04 ENCOUNTER — TRANSITIONAL CARE UNIT VISIT (OUTPATIENT)
Dept: GERIATRICS | Facility: CLINIC | Age: 87
End: 2022-03-04
Payer: COMMERCIAL

## 2022-03-04 DIAGNOSIS — Z51.81 ENCOUNTER FOR THERAPEUTIC DRUG MONITORING: Primary | ICD-10-CM

## 2022-03-04 DIAGNOSIS — Z86.718 HISTORY OF DEEP VENOUS THROMBOSIS: ICD-10-CM

## 2022-03-04 DIAGNOSIS — Z79.01 LONG TERM (CURRENT) USE OF ANTICOAGULANTS: ICD-10-CM

## 2022-03-04 DIAGNOSIS — I48.92 ATRIAL FLUTTER, UNSPECIFIED TYPE (H): ICD-10-CM

## 2022-03-04 DIAGNOSIS — Z86.711 HISTORY OF PULMONARY EMBOLISM: ICD-10-CM

## 2022-03-04 LAB
GAMMA INTERFERON BACKGROUND BLD IA-ACNC: 0.06 IU/ML
M TB IFN-G BLD-IMP: NEGATIVE
M TB IFN-G CD4+ BCKGRND COR BLD-ACNC: 1.26 IU/ML
MITOGEN IGNF BCKGRD COR BLD-ACNC: 0 IU/ML
MITOGEN IGNF BCKGRD COR BLD-ACNC: 0 IU/ML
QUANTIFERON MITOGEN: 1.32 IU/ML
QUANTIFERON NIL TUBE: 0.06 IU/ML
QUANTIFERON TB1 TUBE: 0.06 IU/ML
QUANTIFERON TB2 TUBE: 0.06
SARS-COV-2 RNA RESP QL NAA+PROBE: NEGATIVE
SARS-COV-2 RNA RESP QL NAA+PROBE: NEGATIVE

## 2022-03-04 PROCEDURE — 99308 SBSQ NF CARE LOW MDM 20: CPT | Performed by: NURSE PRACTITIONER

## 2022-03-04 NOTE — PROGRESS NOTES
[facility]IAT[facility]  INITIAL VISIT NOTE  March 7, 2022    PRIMARY CARE PROVIDER AND CLINIC:  Maximo Granados [facility] [address] [address]    CHIEF COMPLAINT:  Hospital follow-up/Initial visit    HPI:    Ciro Wells is a 87 year old  (11/18/1934) male who was seen at [facility] on March 7, 2022 for an initial visit.     Medical history is notable for typical atrial flutter, s/p ablation, hypertension, CKD stage IIIa, prediabetes, pulmonary embolism, lower extremity DVT, meningioma, malignant melanoma, prostate cancer, shingles, and osteoarthritis.    Summary of hospital course:  Patient was hospitalized at [facility] from February 19 through March 2, 2022 after he was found down on the ground in his garage for about 5 hours.  Initial work-up revealed leukocytosis, elevated lactate, elevated troponin I, and elevated BNP.  EKG showed sinus tachycardia and PVCs with junctional escape complexes.  CT angio chest was negative for pulmonary embolus.  It showed interlobular septal thickening and groundglass alveolar opacity affecting the right lung much more than left, likely asymmetric edema and possible superimposed aspiration pneumonitis.  Imaging studies revealed oblique impacted fracture involving the right femoral neck.  Echocardiogram showed severely reduced LV systolic function with EF of 25%, moderate tricuspid regurgitation, and mild pulmonary hypertension.  He underwent coronary angiogram on February 22 which showed minimal nonobstructive CAD, mildly elevated filling pressures, and possible stress cardiomyopathy variant.  Repeat echo on February 22 showed improvement of LVEF to 40-45% and mild-moderate pulmonary hypertension.    He was treated with ceftriaxone and azithromycin for sepsis secondary to aspiration pneumonia.  He underwent posterior hip replacement on February 21.  Elevated troponin I was attributed to type II non-STEMI.  He was started on  metoprolol ER.  Furosemide dose was adjusted, and PTA lisinopril and chlorthalidone were discontinued.    Notably, postop hemoglobin dropped to 8.1 from initial 13.5.  Hospital course was complicated by KEM and creatinine increased to 2.38 on February 22 which eventually improved.    TC was recommended per therapies.    Patient is admitted to this facility for medical management, nursing care, and rehab.     Of note, history was obtained from patient, facility RN, and extensive review of the chart.    Today's visit:  Patient was seen in his room, while lying in bed.  He appears comfortable.  He overall feels very well.  He reports no surgical pain.  He denies fever, chills, cough, sputum, chest pain, palpitation, dyspnea, nausea, vomiting, abdominal pain, or urinary symptoms.  He reports that he had a bowel movement this morning.  Today's INR is therapeutic at 2.6.      CODE STATUS:   CPR/Full code     PAST MEDICAL HISTORY:   Right basilar aspiration pneumonia with sepsis in February 2022  Right femoral neck fracture, s/p posterior hip replacement on February 21, 2022  Acute systolic heart failure, likely due to stress cardiomyopathy; LVEF 25% on February 20, 2022, improved to 40-45%,on February 22, 2022  NSTEMI type II (coronary angiogram on February 22, 2022 revealed minimal nonobstructive CAD)  History of NSTEMI and VT in August 2021  Mild-moderate pulmonary hypertension  Hypertension  Typical atrial flutter, s/p ablation in August 2019  CKD stage IIIa, baseline creatinine 1-1.2  Prediabetes  Right lower extremity DVT and pulmonary embolism in April 2004  Posterior fossa bouchra meningioma, s/p surgery in 2002  Prostate cancer, s/p radical prostatectomy in 1989  Malignant melanoma  Shingles  Osteoarthritis    PAST SURGICAL HISTORY:   Radical prostatectomy  Brain surgery for posterior fossa meningioma in 2002  Bilateral cataract surgery  Posterior hip replacement in February 2022    FAMILY HISTORY:   Father had  "heart failure and  in his 80s.  Mother had Alzheimer's dementia and heart failure and  in her 80s    SOCIAL HISTORY:  Patient is a former smoker and quit in 1970.  Prior to that he had smoked 0.25 pack a day for 10 years.  He drinks a glass of wine or beer on rare occasions.    MEDICATIONS:  Current Outpatient Medications   Medication Sig Dispense Refill     acetaminophen (TYLENOL) 325 MG tablet Take 650 mg by mouth every 4 hours as needed       cholecalciferol 25 MCG (1000 UT) TABS Take 1,000 Units by mouth daily       furosemide (LASIX) 20 MG tablet Take 20 mg by mouth daily       metoprolol succinate ER (TOPROL-XL) 100 MG 24 hr tablet Take 50 mg by mouth daily       Multiple Vitamins-Minerals (CENTRUM SILVER 50+MEN PO) Take 1 tablet by mouth daily       triamcinolone (KENALOG) 0.1 % external cream Apply topically See Admin Instructions Apply to calves topically as needed for dermalitis apply to scaly pink areas on calves 1-2 x daily until smooth as needed       warfarin ANTICOAGULANT (COUMADIN) 5 MG tablet Take 5 mg by mouth At Bedtime         Post Discharge Medication Reconciliation Status: discharge medications reconciled, continue medications without change.      ALLERGIES:  Allergies   Allergen Reactions     Other Environmental Allergy      Pollen, Dust       ROS:  10 point ROS were negative other than the symptoms noted above in the HPI.    PHYSICAL EXAM:  Vital signs were reviewed in the chart.  Vital Signs: /74   Pulse 62   Temp 98.1  F (36.7  C)   Resp 18   Ht 1.854 m (6' 1\")   Wt 107.2 kg (236 lb 6.4 oz)   SpO2 99%   BMI 31.19 kg/m    General: Comfortable and in no acute distress  HEENT: Conjunctival pallor  Cardiovascular: Normal S1, S2, RRR  Respiratory: Lungs clear to auscultation bilaterally  GI: Abdomen soft, non-tender, non-distended, +BS  Extremities: 1+ right ankle and trace left ankle edema.  No calf tenderness.  Neuro: CX II-XII grossly intact; ROM in all four " extremities grossly intact  Psych: Alert and oriented almost x3; normal affect  Skin: No acute rash    LABORATORY/IMAGING DATA:  All relevant labs and imaging data were reviewed personally today.    Chemistry profile (February 27, 2022): Sodium 137, potassium 3.9, BUN 60, creatinine 1.28, calcium 8.2, glucose 174    Hematology profile (February 24, 2022): Hemoglobin 8.1  Hematology profile (February 23, 2022): White count 9.8, hemoglobin 8.7, platelet count 176,000, MCV 92      ASSESSMENT/PLAN:  Right basilar aspiration pneumonia with sepsis,  Acute hypoxic respiratory failure, resolved.  Patient completed the course of antibiotic therapy with azithromycin and ceftriaxone in the hospital.  He was weaned off oxygen during hospitalization.  Blood cultures, strep, and Legionella urine antigen, all negative.  He is afebrile and has no respiratory symptoms.  Plan:  Monitor    Fall, subsequent encounter,  Right femoral neck fracture, s/p posterior hip replacement on February 21, 2022,  Physical deconditioning.  Patient is hemodynamically stable.  Surgical pain is controlled.    Plan:  Fall precautions  WBAT with posterior precautions  Continue pain management with as needed acetaminophen  DVT prophylaxis with chronic warfarin  Continue PT/OT evaluation and therapy  Follow-up with Ortho as instructed    Acute blood loss anemia.  Due to orthopedic surgery.  Postop hemoglobin dropped to 8.1 from initial 13.5.  Plan:   Monitor hemoglobin  Transfuse as needed for hemoglobin less than 7    Acute systolic heart failure,  Mild-moderate pulmonary hypertension.  Acute HF likely due to stress cardiomyopathy; LVEF 25% on February 20, improved to 40-45%,on February 22, 2022.  Patient currently weighs 236.4 LBS and appears almost euvolemic.  Plan:  Continue metoprolol ER 50 mg p.o. daily and furosemide 20 mg p.o. daily  Monitor weight and volume status  Follow-up with cardiology as recommended    NSTEMI type II.  Likely due to hip  fracture.  Coronary angiogram on February 22, 2022 revealed minimal nonobstructive CAD.  Plan:  Continue metoprolol ER 50 mg p.o. daily  Patient is not on antiplatelet therapy due to anticoagulation with warfarin    KEM, resolved,  CKD stage IIIa.  Baseline creatinine 1-1.2  Last creatinine was 1.28 on February 27.  Plan:  Avoid NSAIDs and nephrotoxins  Monitor renal function periodically    Essential hypertension.  Blood pressure is fairly controlled.  Plan:  Continue metoprolol ER 50 mg p.o. daily and furosemide 20 mg p.o. daily  Monitor blood pressure    History of typical atrial flutter, s/p ablation in August 2019,  History of right lower extremity DVT and pulmonary embolism in April 2004.  Patient is chronically anticoagulated with warfarin.  EKG in the hospital revealed sinus rhythm.  Today's INR is 2.6.  Plan:  Continue warfarin 2 mg p.o. at bedtime  Monitor INR  Adjust warfarin dose for target INR range of 2-3    Prediabetes.  Hemoglobin A1c was 6% on February 20, 2022.  Plan:  Recommend low-carb diet  Follow-up as outpatient    History of posterior fossa brain meningioma, s/p surgery in 2002,  History of prostate cancer, s/p radical prostatectomy in 1989.  Plan:  Follow-up as outpatient      Orders written by provider at facility:  Low-carb diet for prediabetes  Warfarin 2 mg p.o. daily, hold for INR more than 3, DX: Atrial flutter  Recheck INR on March 9        Electronically signed by:  Casie Escobar MD

## 2022-03-04 NOTE — LETTER
"    3/4/2022        RE: Ciro Wells  [address]  [place] [address]        [facility]  [facility] Medical Record Number:  [id number]  Place of Service where encounter took place: [facility] (formerly [facility])  ([facility]) [[id number]]    HPI:    Ciro Wells is a 87 year old  (11/18/1934), who is being seen today for an episodic care visit at the above location. Today's concern is INR/Coumadin management for atrial flutter, history of PE, DVT    ROS/Subjective:  Bleeding Signs/Symptoms:  None  Thromboembolic Signs/Symptoms:  None  Medication Changes: Yes, medications adjusted during recent hospitalization  Dietary Changes:  Yes now in U  Activity Changes: Yes: now in U  Bacterial/Viral Infection:  recent sepsis secondary to aspiration pnuemonia, completed antibiotics in the hospital  Missed Coumadin Doses:  INR elevated yesterday held at minimum 3/2 and 3/3  On ASA: No  Other Concerns:  Patient requesting discharge home today. Spoke to therapy who reports patient is assist of 2 for transfers, ambulating 15 feet with assist of 2, not safe for discharge home.    OBJECTIVE:  /66   Pulse 88   Temp 97.9  F (36.6  C)   Resp 20   Ht 1.854 m (6' 0.99\")   Wt 109.3 kg (241 lb)   SpO2 94%   BMI 31.80 kg/m    Last INR: 4.9 on 3/3/22  INR Today:  3.2  Current Dose:  Coumadin held, PTA dosing was 5 mg daily per hospital discharge orders  INR Flow sheet at [facility]:    ASSESSMENT:  1. Encounter for therapeutic drug monitoring    2. Long term (current) use of anticoagulants    3. Atrial flutter, unspecified type (H)    4. History of deep venous thrombosis    5. History of pulmonary embolism      Supratherapeutic INR for goal of 2-3    PLAN/ORDERS:     New Dose: Coumadin 2 mg today    Next INR: INR tomorrow      Discussed patient wanting to discharge home with therapy as above who states discharge at this time is not safe due to requiring assist of 2. Update  who will reach out to family to " discuss.     Electronically signed by:  LORRAINE Che CNP         Sincerely,        LORRAINE Che CNP

## 2022-03-05 ENCOUNTER — TELEPHONE (OUTPATIENT)
Dept: GERIATRICS | Facility: CLINIC | Age: 87
End: 2022-03-05
Payer: COMMERCIAL

## 2022-03-05 NOTE — TELEPHONE ENCOUNTER
[place] [facility] TRIAGE ENCOUNTER    Chief Complaint   Patient presents with     INR RESULTS       Ciro Wells is a 87 year old  (11/18/1934), Nurse called today to report: INR today was 2.8, he is taking 2 mg today    ASSESSMENT/PLAN    Continue with 2 mg today     INR tomorrow    Electronically signed by:   Candelaria Garcia, NP

## 2022-03-06 ENCOUNTER — LAB REQUISITION (OUTPATIENT)
Dept: LAB | Facility: CLINIC | Age: 87
End: 2022-03-06
Payer: COMMERCIAL

## 2022-03-06 ENCOUNTER — TELEPHONE (OUTPATIENT)
Dept: GERIATRICS | Facility: CLINIC | Age: 87
End: 2022-03-06
Payer: COMMERCIAL

## 2022-03-06 DIAGNOSIS — D64.9 ANEMIA, UNSPECIFIED: ICD-10-CM

## 2022-03-06 DIAGNOSIS — U07.1 COVID-19: ICD-10-CM

## 2022-03-06 PROCEDURE — U0003 INFECTIOUS AGENT DETECTION BY NUCLEIC ACID (DNA OR RNA); SEVERE ACUTE RESPIRATORY SYNDROME CORONAVIRUS 2 (SARS-COV-2) (CORONAVIRUS DISEASE [COVID-19]), AMPLIFIED PROBE TECHNIQUE, MAKING USE OF HIGH THROUGHPUT TECHNOLOGIES AS DESCRIBED BY CMS-2020-01-R: HCPCS | Performed by: NURSE PRACTITIONER

## 2022-03-06 NOTE — TELEPHONE ENCOUNTER
[place] [facility] TRIAGE ENCOUNTER    Chief Complaint   Patient presents with     INR RESULTS       Ciro Wells is a 87 year old  (11/18/1934), Nurse called today to report: INR 2.1    ASSESSMENT/PLAN     Continue 2 mg and INR tomorrow    Electronically signed by:   Candelaria Garcia, NP

## 2022-03-07 ENCOUNTER — LAB REQUISITION (OUTPATIENT)
Dept: LAB | Facility: CLINIC | Age: 87
End: 2022-03-07
Payer: COMMERCIAL

## 2022-03-07 ENCOUNTER — TRANSITIONAL CARE UNIT VISIT (OUTPATIENT)
Dept: GERIATRICS | Facility: CLINIC | Age: 87
End: 2022-03-07
Payer: COMMERCIAL

## 2022-03-07 VITALS
OXYGEN SATURATION: 99 % | BODY MASS INDEX: 31.33 KG/M2 | HEIGHT: 73 IN | RESPIRATION RATE: 18 BRPM | SYSTOLIC BLOOD PRESSURE: 126 MMHG | WEIGHT: 236.4 LBS | DIASTOLIC BLOOD PRESSURE: 74 MMHG | TEMPERATURE: 98.1 F | HEART RATE: 62 BPM

## 2022-03-07 DIAGNOSIS — I10 ESSENTIAL HYPERTENSION: ICD-10-CM
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
## 2022-03-07 DIAGNOSIS — R73.03 PREDIABETES: ICD-10-CM

## 2022-03-07 DIAGNOSIS — J96.01 ACUTE RESPIRATORY FAILURE WITH HYPOXIA (H): ICD-10-CM

## 2022-03-07 DIAGNOSIS — Z85.46 HISTORY OF PROSTATE CANCER: ICD-10-CM

## 2022-03-07 DIAGNOSIS — Z86.718 HISTORY OF DEEP VENOUS THROMBOSIS: ICD-10-CM

## 2022-03-07 DIAGNOSIS — Z98.890 S/P ABLATION OF ATRIAL FLUTTER: ICD-10-CM

## 2022-03-07 DIAGNOSIS — Z86.711 HISTORY OF PULMONARY EMBOLISM: ICD-10-CM

## 2022-03-07 DIAGNOSIS — N18.31 STAGE 3A CHRONIC KIDNEY DISEASE (H): ICD-10-CM

## 2022-03-07 DIAGNOSIS — Z86.79 S/P ABLATION OF ATRIAL FLUTTER: ICD-10-CM

## 2022-03-07 DIAGNOSIS — Z86.011 HISTORY OF MENINGIOMA OF THE BRAIN: ICD-10-CM

## 2022-03-07 DIAGNOSIS — I27.20 PULMONARY HYPERTENSION (H): ICD-10-CM

## 2022-03-07 DIAGNOSIS — R53.81 PHYSICAL DECONDITIONING: ICD-10-CM

## 2022-03-07 DIAGNOSIS — I50.21 ACUTE SYSTOLIC HEART FAILURE (H): ICD-10-CM

## 2022-03-07 DIAGNOSIS — U07.1 COVID-19: ICD-10-CM

## 2022-03-07 DIAGNOSIS — I21.4 NSTEMI (NON-ST ELEVATED MYOCARDIAL INFARCTION) (H): ICD-10-CM

## 2022-03-07 DIAGNOSIS — D62 ACUTE BLOOD LOSS ANEMIA: ICD-10-CM

## 2022-03-07 DIAGNOSIS — J69.0 ASPIRATION PNEUMONIA OF RIGHT LOWER LOBE, UNSPECIFIED ASPIRATION PNEUMONIA TYPE (H): Primary | ICD-10-CM

## 2022-03-07 DIAGNOSIS — N17.9 ACUTE KIDNEY INJURY (H): ICD-10-CM

## 2022-03-07 DIAGNOSIS — S72.001D CLOSED FRACTURE OF RIGHT HIP WITH ROUTINE HEALING, SUBSEQUENT ENCOUNTER: ICD-10-CM

## 2022-03-07 LAB
ANION GAP SERPL CALCULATED.3IONS-SCNC: 6 MMOL/L (ref 3–14)
BASOPHILS # BLD AUTO: 0.1 10E3/UL (ref 0–0.2)
BASOPHILS NFR BLD AUTO: 1 %
BUN SERPL-MCNC: 28 MG/DL (ref 7–30)
CALCIUM SERPL-MCNC: 8.9 MG/DL (ref 8.5–10.1)
CHLORIDE BLD-SCNC: 106 MMOL/L (ref 94–109)
CO2 SERPL-SCNC: 25 MMOL/L (ref 20–32)
CREAT SERPL-MCNC: 1.07 MG/DL (ref 0.66–1.25)
EOSINOPHIL # BLD AUTO: 0.2 10E3/UL (ref 0–0.7)
EOSINOPHIL NFR BLD AUTO: 3 %
ERYTHROCYTE [DISTWIDTH] IN BLOOD BY AUTOMATED COUNT: 14.3 % (ref 10–15)
GFR SERPL CREATININE-BSD FRML MDRD: 67 ML/MIN/1.73M2
GLUCOSE BLD-MCNC: 101 MG/DL (ref 70–99)
HCT VFR BLD AUTO: 29.6 % (ref 40–53)
HGB BLD-MCNC: 9.2 G/DL (ref 13.3–17.7)
IMM GRANULOCYTES # BLD: 0.1 10E3/UL
IMM GRANULOCYTES NFR BLD: 1 %
LYMPHOCYTES # BLD AUTO: 1.1 10E3/UL (ref 0.8–5.3)
LYMPHOCYTES NFR BLD AUTO: 16 %
MCH RBC QN AUTO: 29.5 PG (ref 26.5–33)
MCHC RBC AUTO-ENTMCNC: 31.1 G/DL (ref 31.5–36.5)
MCV RBC AUTO: 95 FL (ref 78–100)
MONOCYTES # BLD AUTO: 0.7 10E3/UL (ref 0–1.3)
MONOCYTES NFR BLD AUTO: 10 %
NEUTROPHILS # BLD AUTO: 4.9 10E3/UL (ref 1.6–8.3)
NEUTROPHILS NFR BLD AUTO: 69 %
NRBC # BLD AUTO: 0 10E3/UL
NRBC BLD AUTO-RTO: 0 /100
PLATELET # BLD AUTO: 437 10E3/UL (ref 150–450)
POTASSIUM BLD-SCNC: 4.1 MMOL/L (ref 3.4–5.3)
RBC # BLD AUTO: 3.12 10E6/UL (ref 4.4–5.9)
SARS-COV-2 RNA RESP QL NAA+PROBE: NEGATIVE
SODIUM SERPL-SCNC: 137 MMOL/L (ref 133–144)
WBC # BLD AUTO: 7.1 10E3/UL (ref 4–11)

## 2022-03-07 PROCEDURE — 36415 COLL VENOUS BLD VENIPUNCTURE: CPT | Performed by: NURSE PRACTITIONER

## 2022-03-07 PROCEDURE — U0005 INFEC AGEN DETEC AMPLI PROBE: HCPCS | Performed by: NURSE PRACTITIONER

## 2022-03-07 PROCEDURE — 80048 BASIC METABOLIC PNL TOTAL CA: CPT | Performed by: NURSE PRACTITIONER

## 2022-03-07 PROCEDURE — P9604 ONE-WAY ALLOW PRORATED TRIP: HCPCS | Performed by: NURSE PRACTITIONER

## 2022-03-07 PROCEDURE — 85025 COMPLETE CBC W/AUTO DIFF WBC: CPT | Performed by: NURSE PRACTITIONER

## 2022-03-07 PROCEDURE — 99306 1ST NF CARE HIGH MDM 50: CPT | Performed by: INTERNAL MEDICINE

## 2022-03-07 RX ORDER — WARFARIN SODIUM 2 MG/1
4 TABLET ORAL DAILY
COMMUNITY
End: 2022-03-16

## 2022-03-07 NOTE — PATIENT INSTRUCTIONS
Orders for Ciro Wells (11/18/1934), MR# [id number]:    1. Low-carb diet for prediabetes  2. Warfarin 2 mg p.o. daily, hold for INR more than 3, DX: Atrial flutter  3. Recheck INR on March 9, DX: Atrial flutter    Casie Escobar MD  [facility]

## 2022-03-07 NOTE — LETTER
3/7/2022        RE: Ciro Wells  [address]  [place] [address]        [place] [facility]  INITIAL VISIT NOTE  March 7, 2022    PRIMARY CARE PROVIDER AND CLINIC:  [facility] [address] [id number]    CHIEF COMPLAINT:  Hospital follow-up/Initial visit    HPI:    Ciro Wells is a 87 year old  (11/18/1934) male who was seen at [facility] on March 7, 2022 for an initial visit.     Medical history is notable for typical atrial flutter, s/p ablation, hypertension, CKD stage IIIa, prediabetes, pulmonary embolism, lower extremity DVT, meningioma, malignant melanoma, prostate cancer, shingles, and osteoarthritis.    Summary of hospital course:  Patient was hospitalized at [facility] from February 19 through March 2, 2022 after he was found down on the ground in his garage for about 5 hours.  Initial work-up revealed leukocytosis, elevated lactate, elevated troponin I, and elevated BNP.  EKG showed sinus tachycardia and PVCs with junctional escape complexes.  CT angio chest was negative for pulmonary embolus.  It showed interlobular septal thickening and groundglass alveolar opacity affecting the right lung much more than left, likely asymmetric edema and possible superimposed aspiration pneumonitis.  Imaging studies revealed oblique impacted fracture involving the right femoral neck.  Echocardiogram showed severely reduced LV systolic function with EF of 25%, moderate tricuspid regurgitation, and mild pulmonary hypertension.  He underwent coronary angiogram on February 22 which showed minimal nonobstructive CAD, mildly elevated filling pressures, and possible stress cardiomyopathy variant.  Repeat echo on February 22 showed improvement of LVEF to 40-45% and mild-moderate pulmonary hypertension.    He was treated with ceftriaxone and azithromycin for sepsis secondary to aspiration pneumonia.  He underwent posterior hip replacement on February 21.   Elevated troponin I was attributed to type II non-STEMI.  He was started on metoprolol ER.  Furosemide dose was adjusted, and PTA lisinopril and chlorthalidone were discontinued.    Notably, postop hemoglobin dropped to 8.1 from initial 13.5.  Hospital course was complicated by KEM and creatinine increased to 2.38 on February 22 which eventually improved.    TC was recommended per therapies.    Patient is admitted to this facility for medical management, nursing care, and rehab.     Of note, history was obtained from patient, facility RN, and extensive review of the chart.    Today's visit:  Patient was seen in his room, while lying in bed.  He appears comfortable.  He overall feels very well.  He reports no surgical pain.  He denies fever, chills, cough, sputum, chest pain, palpitation, dyspnea, nausea, vomiting, abdominal pain, or urinary symptoms.  He reports that he had a bowel movement this morning.  Today's INR is therapeutic at 2.6.      CODE STATUS:   CPR/Full code     PAST MEDICAL HISTORY:   Right basilar aspiration pneumonia with sepsis in February 2022  Right femoral neck fracture, s/p posterior hip replacement on February 21, 2022  Acute systolic heart failure, likely due to stress cardiomyopathy; LVEF 25% on February 20, 2022, improved to 40-45%,on February 22, 2022  NSTEMI type II (coronary angiogram on February 22, 2022 revealed minimal nonobstructive CAD)  History of NSTEMI and VT in August 2021  Mild-moderate pulmonary hypertension  Hypertension  Typical atrial flutter, s/p ablation in August 2019  CKD stage IIIa, baseline creatinine 1-1.2  Prediabetes  Right lower extremity DVT and pulmonary embolism in April 2004  Posterior fossa bouchra meningioma, s/p surgery in 2002  Prostate cancer, s/p radical prostatectomy in 1989  Malignant melanoma  Shingles  Osteoarthritis    PAST SURGICAL HISTORY:   Radical prostatectomy  Brain surgery for posterior fossa meningioma in 2002  Bilateral cataract  "surgery  Posterior hip replacement in 2022    FAMILY HISTORY:   Father had heart failure and  in his 80s.  Mother had Alzheimer's dementia and heart failure and  in her 80s    SOCIAL HISTORY:  Patient is a former smoker and quit in 1970.  Prior to that he had smoked 0.25 pack a day for 10 years.  He drinks a glass of wine or beer on rare occasions.    MEDICATIONS:  Current Outpatient Medications   Medication Sig Dispense Refill     acetaminophen (TYLENOL) 325 MG tablet Take 650 mg by mouth every 4 hours as needed       cholecalciferol 25 MCG (1000 UT) TABS Take 1,000 Units by mouth daily       furosemide (LASIX) 20 MG tablet Take 20 mg by mouth daily       metoprolol succinate ER (TOPROL-XL) 100 MG 24 hr tablet Take 50 mg by mouth daily       Multiple Vitamins-Minerals (CENTRUM SILVER 50+MEN PO) Take 1 tablet by mouth daily       triamcinolone (KENALOG) 0.1 % external cream Apply topically See Admin Instructions Apply to calves topically as needed for dermalitis apply to scaly pink areas on calves 1-2 x daily until smooth as needed       warfarin ANTICOAGULANT (COUMADIN) 5 MG tablet Take 5 mg by mouth At Bedtime         Post Discharge Medication Reconciliation Status: discharge medications reconciled, continue medications without change.      ALLERGIES:  Allergies   Allergen Reactions     Other Environmental Allergy      Pollen, Dust       ROS:  10 point ROS were negative other than the symptoms noted above in the HPI.    PHYSICAL EXAM:  Vital signs were reviewed in the chart.  Vital Signs: /74   Pulse 62   Temp 98.1  F (36.7  C)   Resp 18   Ht 1.854 m (6' 1\")   Wt 107.2 kg (236 lb 6.4 oz)   SpO2 99%   BMI 31.19 kg/m    General: Comfortable and in no acute distress  HEENT: Conjunctival pallor  Cardiovascular: Normal S1, S2, RRR  Respiratory: Lungs clear to auscultation bilaterally  GI: Abdomen soft, non-tender, non-distended, +BS  Extremities: 1+ right ankle and trace left ankle " edema.  No calf tenderness.  Neuro: CX II-XII grossly intact; ROM in all four extremities grossly intact  Psych: Alert and oriented almost x3; normal affect  Skin: No acute rash    LABORATORY/IMAGING DATA:  All relevant labs and imaging data were reviewed personally today.    Chemistry profile (February 27, 2022): Sodium 137, potassium 3.9, BUN 60, creatinine 1.28, calcium 8.2, glucose 174    Hematology profile (February 24, 2022): Hemoglobin 8.1  Hematology profile (February 23, 2022): White count 9.8, hemoglobin 8.7, platelet count 176,000, MCV 92      ASSESSMENT/PLAN:  Right basilar aspiration pneumonia with sepsis,  Acute hypoxic respiratory failure, resolved.  Patient completed the course of antibiotic therapy with azithromycin and ceftriaxone in the hospital.  He was weaned off oxygen during hospitalization.  Blood cultures, strep, and Legionella urine antigen, all negative.  He is afebrile and has no respiratory symptoms.  Plan:  Monitor    Fall, subsequent encounter,  Right femoral neck fracture, s/p posterior hip replacement on February 21, 2022,  Physical deconditioning.  Patient is hemodynamically stable.  Surgical pain is controlled.    Plan:  Fall precautions  WBAT with posterior precautions  Continue pain management with as needed acetaminophen  DVT prophylaxis with chronic warfarin  Continue PT/OT evaluation and therapy  Follow-up with Ortho as instructed    Acute blood loss anemia.  Due to orthopedic surgery.  Postop hemoglobin dropped to 8.1 from initial 13.5.  Plan:   Monitor hemoglobin  Transfuse as needed for hemoglobin less than 7    Acute systolic heart failure,  Mild-moderate pulmonary hypertension.  Acute HF likely due to stress cardiomyopathy; LVEF 25% on February 20, improved to 40-45%,on February 22, 2022.  Patient currently weighs 236.4 LBS and appears almost euvolemic.  Plan:  Continue metoprolol ER 50 mg p.o. daily and furosemide 20 mg p.o. daily  Monitor weight and volume  status  Follow-up with cardiology as recommended    NSTEMI type II.  Likely due to hip fracture.  Coronary angiogram on February 22, 2022 revealed minimal nonobstructive CAD.  Plan:  Continue metoprolol ER 50 mg p.o. daily  Patient is not on antiplatelet therapy due to anticoagulation with warfarin    KEM, resolved,  CKD stage IIIa.  Baseline creatinine 1-1.2  Last creatinine was 1.28 on February 27.  Plan:  Avoid NSAIDs and nephrotoxins  Monitor renal function periodically    Essential hypertension.  Blood pressure is fairly controlled.  Plan:  Continue metoprolol ER 50 mg p.o. daily and furosemide 20 mg p.o. daily  Monitor blood pressure    History of typical atrial flutter, s/p ablation in August 2019,  History of right lower extremity DVT and pulmonary embolism in April 2004.  Patient is chronically anticoagulated with warfarin.  EKG in the hospital revealed sinus rhythm.  Today's INR is 2.6.  Plan:  Continue warfarin 2 mg p.o. at bedtime  Monitor INR  Adjust warfarin dose for target INR range of 2-3    Prediabetes.  Hemoglobin A1c was 6% on February 20, 2022.  Plan:  Recommend low-carb diet  Follow-up as outpatient    History of posterior fossa brain meningioma, s/p surgery in 2002,  History of prostate cancer, s/p radical prostatectomy in 1989.  Plan:  Follow-up as outpatient      Orders written by provider at facility:  Low-carb diet for prediabetes  Warfarin 2 mg p.o. daily, hold for INR more than 3, DX: Atrial flutter  Recheck INR on March 9        Electronically signed by:  Casie Escobar MD                          Sincerely,        Casie Escobar MD

## 2022-03-08 LAB — SARS-COV-2 RNA RESP QL NAA+PROBE: NEGATIVE

## 2022-03-09 ENCOUNTER — TRANSITIONAL CARE UNIT VISIT (OUTPATIENT)
Dept: GERIATRICS | Facility: CLINIC | Age: 87
End: 2022-03-09
Payer: COMMERCIAL

## 2022-03-09 VITALS
BODY MASS INDEX: 30.96 KG/M2 | OXYGEN SATURATION: 97 % | HEIGHT: 73 IN | HEART RATE: 58 BPM | SYSTOLIC BLOOD PRESSURE: 137 MMHG | RESPIRATION RATE: 18 BRPM | DIASTOLIC BLOOD PRESSURE: 52 MMHG | WEIGHT: 233.6 LBS | TEMPERATURE: 98.2 F

## 2022-03-09 DIAGNOSIS — I25.10 CORONARY ARTERY DISEASE INVOLVING NATIVE CORONARY ARTERY OF NATIVE HEART WITHOUT ANGINA PECTORIS: ICD-10-CM

## 2022-03-09 DIAGNOSIS — R53.81 PHYSICAL DECONDITIONING: ICD-10-CM

## 2022-03-09 DIAGNOSIS — I10 ESSENTIAL HYPERTENSION: ICD-10-CM

## 2022-03-09 DIAGNOSIS — J96.01 ACUTE RESPIRATORY FAILURE WITH HYPOXIA (H): ICD-10-CM

## 2022-03-09 DIAGNOSIS — N18.31 STAGE 3A CHRONIC KIDNEY DISEASE (H): ICD-10-CM

## 2022-03-09 DIAGNOSIS — D62 ACUTE BLOOD LOSS ANEMIA: ICD-10-CM

## 2022-03-09 DIAGNOSIS — J69.0 ASPIRATION PNEUMONIA, UNSPECIFIED ASPIRATION PNEUMONIA TYPE, UNSPECIFIED LATERALITY, UNSPECIFIED PART OF LUNG (H): ICD-10-CM

## 2022-03-09 DIAGNOSIS — S72.001D CLOSED FRACTURE OF RIGHT HIP WITH ROUTINE HEALING, SUBSEQUENT ENCOUNTER: ICD-10-CM

## 2022-03-09 DIAGNOSIS — Z86.79 S/P ABLATION OF ATRIAL FLUTTER: ICD-10-CM

## 2022-03-09 DIAGNOSIS — I27.20 PULMONARY HYPERTENSION (H): ICD-10-CM

## 2022-03-09 DIAGNOSIS — I50.21 ACUTE HFREF (HEART FAILURE WITH REDUCED EJECTION FRACTION) (H): Primary | ICD-10-CM

## 2022-03-09 DIAGNOSIS — Z86.718 HISTORY OF DEEP VENOUS THROMBOSIS: ICD-10-CM

## 2022-03-09 DIAGNOSIS — Z86.711 HISTORY OF PULMONARY EMBOLISM: ICD-10-CM

## 2022-03-09 DIAGNOSIS — I42.9 CARDIOMYOPATHY, UNSPECIFIED TYPE (H): ICD-10-CM

## 2022-03-09 DIAGNOSIS — Z98.890 S/P ABLATION OF ATRIAL FLUTTER: ICD-10-CM

## 2022-03-09 PROCEDURE — 99309 SBSQ NF CARE MODERATE MDM 30: CPT | Performed by: NURSE PRACTITIONER

## 2022-03-09 NOTE — PROGRESS NOTES
[facility]    Chief Complaint   Patient presents with     RECHECK     HPI:  Ciro Wells is a 87 year old  (11/18/1934), who is being seen today for an episodic care visit at: [facility]  ([facility]) [[id number]].     Past medical history is significant for history of DVT on Coumadin, atrial arrhythmia status post ablation August 30, 2019, hypertension, cerebellar neoplasm requiring surgery 2002, prostate cancer s/p prostatectomy.     Summary of recent hospitalization:  Ciro Wells was hospitalized at [facility] from February 19 through March 2, 2022.  He presented to the emergency department via EMS after he was found down on the ground in his garage for about 5 hours. Upon presentation to ED he was found to have elevated WBC 19.7, elevated lactate 2.4, tachycardia 120-130s on admission, elevated troponin 1.42-->2.51-->30.92. BNP elevated to 2534 on 2/21. CTA of chest revealed no PE, interlobular septal thickening and groundglass alveolar opacity affecting the right lung much more than the left. Trace right pleural effusion. Findings are likely on the basis of asymmetric edema and possible superimposed aspiration pneumonitis on the right. Step and legionella urine AG were negative. Blood cultures were negative. He was treated for sepsis secondary to aspiration pneumonia with ceftriaxone and azithromycin. He did require oxygen which was weaned off prior to hospital discharge. Cardiology consulted on admission due to elevated troponin, tachycardia. ECHO 2/20/22 showed EF severely reduced to 25%, cannot comment on WMA, mildly increased left ventricular wall thickness, RV is suboptimally visualized (grossly normal size with reduced function, left atrial chamber dimension is mildly enlarged, trace mitral valve regurgitation, moderate tricuspid valve regurgitation, Mild pulmonary hypertension, proximal ascending aorta is borderline dilated. Repeat ECHO 2/2022 with improved EF 40-45%,  "which is improved from previous, distal inferior and apical walls hypokinetic, which has has improved from previous. Underwent coronary angiogram 2/22 that showed Minimal non-obstructive CAD, Possible stress cardiomyopathy variant, type II NSTEMI in setting of recent fall / hip fracture. Started on metoprolol ER, lasix dose was adjusted. PTA lisinopril and chlorthalidone were discontinued. Follow up with cardiology outpatient. Ortho was also consulted on admission. Xray of pelvis revealed \"Oblique impacted fracture involving the right femoral neck. The right femoral shaft is retracted proximally and impacted upon the right femoral neck. No dislocation of the femoral/acetabular articulation which demonstrates moderate degenerative change. Moderate to degenerative changes left hip. Degenerative changes both SI joints. Surgical clips in the pelvis\" and he is status post posterior hip placement on February 21. He was started on vitamin d supplementation (vitamin d level low at 24). Should follow up with ortho on 3/7 for incision check. Discharged to TCU for physical rehabilitation and medical management.     Today's concern is: Seen today for routine follow up at TCU. Denies any pain to hip. Reports pain is manageable when working with therapy. Denies SOB, CP, dizziness. No swelling to legs today. Reports bowels moving well. Denies dysuria, trouble voiding. Continues to work with therapy.    Allergies, and PMH/PSH reviewed in EPIC today.  REVIEW OF SYSTEMS:  10 point ROS of systems including Constitutional, Eyes, Respiratory, Cardiovascular, Gastroenterology, Genitourinary, Integumentary, Musculoskeletal, Psychiatric were all negative except for pertinent positives noted in my HPI.    Objective:   /52   Pulse 58   Temp 98.2  F (36.8  C)   Resp 18   Ht 1.854 m (6' 1\")   Wt 106 kg (233 lb 9.6 oz)   SpO2 97%   BMI 30.82 kg/m    GENERAL APPEARANCE:  Alert, in NAD  HEENT: normocephalic, moist mucous membranes, " nose without drainage or crusting  RESP:  respiratory effort normal, no respiratory distress, Lung sounds clear, patient is on RA  CV: auscultation of heart done, rate and rhythm irregular.  ABDOMEN: + bowel sounds, soft, nontender, no grimacing or guarding with palpation.  M/S:no lower extremity edema; good sensation to feet, + pedal pulses  SKIN:  Inspection and palpation of skin and subcutaneous tissue: skin warm, dry without rashes  NEURO: cranial nerves 2-12 grossly intact and at patient's baseline; moves extremities freely  PSYCH: oriented x 3, affect and mood normal    Labs done in SNF are in [facility]. Please refer to them using EPIC/Care Everywhere. and Recent labs in EPIC reviewed by me today.     Assessment/Plan:  (I21.4) NSTEMI (non-ST elevated myocardial infarction) (H)  (primary encounter diagnosis)  (I50.21) Acute HFrEF (heart failure with reduced ejection fraction) (H)  (I42.9) Cardiomyopathy, unspecified type (H)  (I27.20) Pulmonary hypertension (H)  (I25.10) Coronary artery disease involving native coronary artery of native heart without angina pectoris  (I10) Essential hypertension  Comment: Found to have elevated troponin on admission to hospital elevated troponin 1.42-->2.51-->30.92  -cardiology consulted   -ECHO 2/20/22 showed EF severely reduced to 25%, cannot comment on WMA, mildly increased left ventricular wall thickness, RV is suboptimally visualized (grossly normal size with reduced function, left atrial chamber dimension is mildly enlarged, trace mitral valve regurgitation, moderate tricuspid valve regurgitation, Mild pulmonary hypertension, proximal ascending aorta is borderline dilated.   -Repeat ECHO 2/22/2022 with improved EF 40-45%, which is improved from previous, distal inferior and apical walls hypokinetic, which has has improved from previous.   -Underwent coronary angiogram 2/22 that showed Minimal non-obstructive CAD, Possible stress cardiomyopathy variant, type II NSTEMI  in setting of recent fall / hip fracture.   -denies CP today.   -appears euvolemic today, no swelling and no SOB, weight trending down  -BP controlled 137/52, 120/50, 134/74; HR 58, 67, 57  -PTA lisinopril and chlorthalidone discontinued in hospital, started on metoprolol ER and lasix  -was not started on aspirin or statin during hospitalization  Plan: Continue lasix 20 mg daily, metoprolol ER 50 mg daily. BMP 3/4. TG shapes on in AM, off in PM. VS per policy. Adjust medications as needed. Daily weights. Notify provider with weight gain >2 lbs in a day, >5 lbs in on week. 2 gram Na diet. Follow up with cardiology Dr. Childs through [facility].    (Z98.890, Z86.79) S/p ablation 8/30/2019  (Z86.718) History of deep venous thrombosis  (Z86.711) History of PE  (Z79.01) Chronic anticoagulation  Comment: HR tachycardic on admission to hospital  -CTA on admission negative for PE  -metoprolol XL started in hospital  -INR today 1.7 (subtherapeutic) for goal 2-3  -PTA dosing was coumadin 5 mg daily  -HR at TCU controlled: 58, 67, 57  Plan: Continue metoprolol ER 50 mg daily. Coumadin 4 mg daily. INR 3/11. VS per policy. Adjust medications as needed. Follow up with cardiology.    (J69.0) Aspiration pneumonia, unspecified aspiration pneumonia type, unspecified laterality, unspecified part of lung (H)  (J96.01) Acute respiratory failure with hypoxia (H)  Comment: found to be septic on admission to hospital WBC 19.7, elevated lactate 2.4.  -CTA of chest revealed no PE, interlobular septal thickening and groundglass alveolar opacity affecting the right lung much more than the left. Trace right pleural effusion. Findings are likely on the basis of asymmetric edema and possible superimposed aspiration pneumonitis on the right.   -Step and legionella urine AG were negative.   -Blood cultures were negative.   -He was treated for sepsis secondary to aspiration pneumonia with ceftriaxone and azithromycin.   -Oxygen was weaned off during  hospitalization  -is afebrile and hemodynamically stable. Denies SOB, cough  Plan: Continue IS 10 reps QID with assist. Monitor respiratory status.    (N18.31) Stage 3a chronic kidney disease(H)  Comment: with KEM during hospitalization suspected to be secondary to intraoperative hypotension, contrast from CTA, possible congestion from CHF  -creatinine improving at hospital discharge was 1.28 on 2/27  -baseline creatinine around 1.2  -creatinine at last check at baseline  Creatinine   Date Value Ref Range Status   03/07/2022 1.07 0.66 - 1.25 mg/dL Final     Plan: BMP 3/14. Avoid nephrotoxins. Renally dose medications as indicated.    (S72.001D) Closed fracture of right hip with routine healing, subsequent encounter  Comment: s/p posterior right hip placement on February 21  -pain currently managed   Plan: Continue tylenol PRN. Continue warfarin for DVT prophy. Continue vitamin d supplementation. WBAT. Therapy as below. Follow up with ortho per recommendations.  Follow up with PCP for osteoporosis work up outpatient     (D62) Post op anemia  Comment: Hgb at last check on 2/24 was 8.1, down for admission hgb of 13.5  -no s.sx of bleeding today  Hemoglobin   Date Value Ref Range Status   03/07/2022 9.2 (L) 13.3 - 17.7 g/dL Final     Plan: CBC 3/14.     (R53.81) Physical deconditioning  Comment: Acute, secondary to recent hospitalization, medical conditions as above  -continues to work with therapy.  Plan: Encourage participation in physical therapy/occupational therapy for strengthening and deconditioning. Discharge planning per their recommendation. Social work to assist with d/c planning.        Electronically signed by: LORRAINE Che CNP

## 2022-03-09 NOTE — LETTER
3/9/2022        RE: Ciro Wells  [address]  [place] [address]        [facility] GERIATRICS    Chief Complaint   Patient presents with     RECHECK     HPI:  Ciro Wells is a 87 year old  (11/18/1934), who is being seen today for an episodic care visit at: [facility]  ([facility]) [[id number]].     Past medical history is significant for history of DVT on Coumadin, atrial arrhythmia status post ablation August 30, 2019, hypertension, cerebellar neoplasm requiring surgery 2002, prostate cancer s/p prostatectomy.     Summary of recent hospitalization:  Ciro Wells was hospitalized at [facility] from February 19 through March 2, 2022.  He presented to the emergency department via EMS after he was found down on the ground in his garage for about 5 hours. Upon presentation to ED he was found to have elevated WBC 19.7, elevated lactate 2.4, tachycardia 120-130s on admission, elevated troponin 1.42-->2.51-->30.92. BNP elevated to 2534 on 2/21. CTA of chest revealed no PE, interlobular septal thickening and groundglass alveolar opacity affecting the right lung much more than the left. Trace right pleural effusion. Findings are likely on the basis of asymmetric edema and possible superimposed aspiration pneumonitis on the right. Step and legionella urine AG were negative. Blood cultures were negative. He was treated for sepsis secondary to aspiration pneumonia with ceftriaxone and azithromycin. He did require oxygen which was weaned off prior to hospital discharge. Cardiology consulted on admission due to elevated troponin, tachycardia. ECHO 2/20/22 showed EF severely reduced to 25%, cannot comment on WMA, mildly increased left ventricular wall thickness, RV is suboptimally visualized (grossly normal size with reduced function, left atrial chamber dimension is mildly enlarged, trace mitral valve regurgitation, moderate tricuspid valve regurgitation, Mild pulmonary hypertension, proximal  "ascending aorta is borderline dilated. Repeat ECHO 2/2022 with improved EF 40-45%, which is improved from previous, distal inferior and apical walls hypokinetic, which has has improved from previous. Underwent coronary angiogram 2/22 that showed Minimal non-obstructive CAD, Possible stress cardiomyopathy variant, type II NSTEMI in setting of recent fall / hip fracture. Started on metoprolol ER, lasix dose was adjusted. PTA lisinopril and chlorthalidone were discontinued. Follow up with cardiology outpatient. Ortho was also consulted on admission. Xray of pelvis revealed \"Oblique impacted fracture involving the right femoral neck. The right femoral shaft is retracted proximally and impacted upon the right femoral neck. No dislocation of the femoral/acetabular articulation which demonstrates moderate degenerative change. Moderate to degenerative changes left hip. Degenerative changes both SI joints. Surgical clips in the pelvis\" and he is status post posterior hip placement on February 21. He was started on vitamin d supplementation (vitamin d level low at 24). Should follow up with ortho on 3/7 for incision check. Discharged to TCU for physical rehabilitation and medical management.     Today's concern is: Seen today for routine follow up at TCU. Denies any pain to hip. Reports pain is manageable when working with therapy. Denies SOB, CP, dizziness. No swelling to legs today. Reports bowels moving well. Denies dysuria, trouble voiding. Continues to work with therapy.    Allergies, and PMH/PSH reviewed in EPIC today.  REVIEW OF SYSTEMS:  10 point ROS of systems including Constitutional, Eyes, Respiratory, Cardiovascular, Gastroenterology, Genitourinary, Integumentary, Musculoskeletal, Psychiatric were all negative except for pertinent positives noted in my HPI.    Objective:   /52   Pulse 58   Temp 98.2  F (36.8  C)   Resp 18   Ht 1.854 m (6' 1\")   Wt 106 kg (233 lb 9.6 oz)   SpO2 97%   BMI 30.82 kg/m  "   GENERAL APPEARANCE:  Alert, in NAD  HEENT: normocephalic, moist mucous membranes, nose without drainage or crusting  RESP:  respiratory effort normal, no respiratory distress, Lung sounds clear, patient is on RA  CV: auscultation of heart done, rate and rhythm irregular.  ABDOMEN: + bowel sounds, soft, nontender, no grimacing or guarding with palpation.  M/S:no lower extremity edema; good sensation to feet, + pedal pulses  SKIN:  Inspection and palpation of skin and subcutaneous tissue: skin warm, dry without rashes  NEURO: cranial nerves 2-12 grossly intact and at patient's baseline; moves extremities freely  PSYCH: oriented x 3, affect and mood normal    Labs done in SNF are in [facility]. Please refer to them using [employer]/Care Everywhere. and Recent labs in EPIC reviewed by me today.     Assessment/Plan:  (I21.4) NSTEMI (non-ST elevated myocardial infarction) (H)  (primary encounter diagnosis)  (I50.21) Acute HFrEF (heart failure with reduced ejection fraction) (H)  (I42.9) Cardiomyopathy, unspecified type (H)  (I27.20) Pulmonary hypertension (H)  (I25.10) Coronary artery disease involving native coronary artery of native heart without angina pectoris  (I10) Essential hypertension  Comment: Found to have elevated troponin on admission to hospital elevated troponin 1.42-->2.51-->30.92  -cardiology consulted   -ECHO 2/20/22 showed EF severely reduced to 25%, cannot comment on WMA, mildly increased left ventricular wall thickness, RV is suboptimally visualized (grossly normal size with reduced function, left atrial chamber dimension is mildly enlarged, trace mitral valve regurgitation, moderate tricuspid valve regurgitation, Mild pulmonary hypertension, proximal ascending aorta is borderline dilated.   -Repeat ECHO 2/22/2022 with improved EF 40-45%, which is improved from previous, distal inferior and apical walls hypokinetic, which has has improved from previous.   -Underwent coronary angiogram 2/22 that showed  Minimal non-obstructive CAD, Possible stress cardiomyopathy variant, type II NSTEMI in setting of recent fall / hip fracture.   -denies CP today.   -appears euvolemic today, no swelling and no SOB, weight trending down  -BP controlled 137/52, 120/50, 134/74; HR 58, 67, 57  -PTA lisinopril and chlorthalidone discontinued in hospital, started on metoprolol ER and lasix  -was not started on aspirin or statin during hospitalization  Plan: Continue lasix 20 mg daily, metoprolol ER 50 mg daily. BMP 3/4. TG shapes on in AM, off in PM. VS per policy. Adjust medications as needed. Daily weights. Notify provider with weight gain >2 lbs in a day, >5 lbs in on week. 2 gram Na diet. Follow up with cardiology Dr. Childs through [facility].    (Z98.890, Z86.79) S/p ablation 8/30/2019  (Z86.718) History of deep venous thrombosis  (Z86.711) History of PE  (Z79.01) Chronic anticoagulation  Comment: HR tachycardic on admission to hospital  -CTA on admission negative for PE  -metoprolol XL started in hospital  -INR today 1.7 (subtherapeutic) for goal 2-3  -PTA dosing was coumadin 5 mg daily  -HR at TCU controlled: 58, 67, 57  Plan: Continue metoprolol ER 50 mg daily. Coumadin 4 mg daily. INR 3/11. VS per policy. Adjust medications as needed. Follow up with cardiology.    (J69.0) Aspiration pneumonia, unspecified aspiration pneumonia type, unspecified laterality, unspecified part of lung (H)  (J96.01) Acute respiratory failure with hypoxia (H)  Comment: found to be septic on admission to hospital WBC 19.7, elevated lactate 2.4.  -CTA of chest revealed no PE, interlobular septal thickening and groundglass alveolar opacity affecting the right lung much more than the left. Trace right pleural effusion. Findings are likely on the basis of asymmetric edema and possible superimposed aspiration pneumonitis on the right.   -Step and legionella urine AG were negative.   -Blood cultures were negative.   -He was treated for sepsis secondary to  aspiration pneumonia with ceftriaxone and azithromycin.   -Oxygen was weaned off during hospitalization  -is afebrile and hemodynamically stable. Denies SOB, cough  Plan: Continue IS 10 reps QID with assist. Monitor respiratory status.    (N18.31) Stage 3a chronic kidney disease(H)  Comment: with KEM during hospitalization suspected to be secondary to intraoperative hypotension, contrast from CTA, possible congestion from CHF  -creatinine improving at hospital discharge was 1.28 on 2/27  -baseline creatinine around 1.2  -creatinine at last check at baseline  Creatinine   Date Value Ref Range Status   03/07/2022 1.07 0.66 - 1.25 mg/dL Final     Plan: BMP 3/14. Avoid nephrotoxins. Renally dose medications as indicated.    (S72.001D) Closed fracture of right hip with routine healing, subsequent encounter  Comment: s/p posterior right hip placement on February 21  -pain currently managed   Plan: Continue tylenol PRN. Continue warfarin for DVT prophy. Continue vitamin d supplementation. WBAT. Therapy as below. Follow up with ortho per recommendations.  Follow up with PCP for osteoporosis work up outpatient     (D62) Post op anemia  Comment: Hgb at last check on 2/24 was 8.1, down for admission hgb of 13.5  -no s.sx of bleeding today  Hemoglobin   Date Value Ref Range Status   03/07/2022 9.2 (L) 13.3 - 17.7 g/dL Final     Plan: CBC 3/14.     (R53.81) Physical deconditioning  Comment: Acute, secondary to recent hospitalization, medical conditions as above  -continues to work with therapy.  Plan: Encourage participation in physical therapy/occupational therapy for strengthening and deconditioning. Discharge planning per their recommendation. Social work to assist with d/c planning.        Electronically signed by: LORRAINE Che CNP             Sincerely,        LORRAINE Che CNP

## 2022-03-10 ENCOUNTER — LAB REQUISITION (OUTPATIENT)
Dept: LAB | Facility: CLINIC | Age: 87
End: 2022-03-10
Payer: COMMERCIAL

## 2022-03-10 VITALS
SYSTOLIC BLOOD PRESSURE: 141 MMHG | RESPIRATION RATE: 18 BRPM | DIASTOLIC BLOOD PRESSURE: 50 MMHG | OXYGEN SATURATION: 96 % | BODY MASS INDEX: 30.62 KG/M2 | TEMPERATURE: 98.1 F | HEIGHT: 73 IN | HEART RATE: 61 BPM | WEIGHT: 231 LBS

## 2022-03-10 DIAGNOSIS — U07.1 COVID-19: ICD-10-CM

## 2022-03-10 PROCEDURE — U0003 INFECTIOUS AGENT DETECTION BY NUCLEIC ACID (DNA OR RNA); SEVERE ACUTE RESPIRATORY SYNDROME CORONAVIRUS 2 (SARS-COV-2) (CORONAVIRUS DISEASE [COVID-19]), AMPLIFIED PROBE TECHNIQUE, MAKING USE OF HIGH THROUGHPUT TECHNOLOGIES AS DESCRIBED BY CMS-2020-01-R: HCPCS | Performed by: NURSE PRACTITIONER

## 2022-03-10 NOTE — PROGRESS NOTES
"[facility] GERIATRICS  [place] Medical Record Number:  [id number]  Place of Service where encounter took place: [facility]  ([facility]) [[id number]]    HPI:    Ciro Wells is a 87 year old  (11/18/1934), who is being seen today for an episodic care visit at the above location. Today's concern is INR/Coumadin management for  Atrial flutter, history of PE, history of DVT  ROS/Subjective:  Bleeding Signs/Symptoms:  None  Thromboembolic Signs/Symptoms:  None  Medication Changes:  No  Dietary Changes:  Yes: now at [facility]  Activity Changes: Yes: now at [facility]  Bacterial/Viral Infection:  No  Missed Coumadin Doses:  Held 3/3  On ASA: No  Other Concerns:  No    OBJECTIVE:  BP (!) 141/50   Pulse 61   Temp 98.1  F (36.7  C)   Resp 18   Ht 1.854 m (6' 1\")   Wt 104.8 kg (231 lb)   SpO2 96%   BMI 30.48 kg/m    Last INR: 1.7 on 3/9/22  INR Today:  1.6  Current Dose:  Coumadin 4 mg daily  INR Flow sheet at [facility]:    ASSESSMENT:  1. Encounter for therapeutic drug monitoring    2. Long term (current) use of anticoagulants    3. History of deep venous thrombosis    4. S/P ablation of atrial flutter    5. History of pulmonary embolism      Subtherapeutic INR for goal of 2-3    PLAN/ORDERS:     New Dose: Coumadin 6 mg today, 5 mg Saturday and Sunday    Next INR: 3/14    Electronically signed by:  LORRAINE Che CNP     "

## 2022-03-11 ENCOUNTER — TRANSITIONAL CARE UNIT VISIT (OUTPATIENT)
Dept: GERIATRICS | Facility: CLINIC | Age: 87
End: 2022-03-11
Payer: COMMERCIAL

## 2022-03-11 DIAGNOSIS — Z51.81 ENCOUNTER FOR THERAPEUTIC DRUG MONITORING: Primary | ICD-10-CM

## 2022-03-11 DIAGNOSIS — Z86.718 HISTORY OF DEEP VENOUS THROMBOSIS: ICD-10-CM

## 2022-03-11 DIAGNOSIS — Z98.890 S/P ABLATION OF ATRIAL FLUTTER: ICD-10-CM

## 2022-03-11 DIAGNOSIS — Z86.711 HISTORY OF PULMONARY EMBOLISM: ICD-10-CM

## 2022-03-11 DIAGNOSIS — Z79.01 LONG TERM (CURRENT) USE OF ANTICOAGULANTS: ICD-10-CM

## 2022-03-11 DIAGNOSIS — Z86.79 S/P ABLATION OF ATRIAL FLUTTER: ICD-10-CM

## 2022-03-11 LAB — SARS-COV-2 RNA RESP QL NAA+PROBE: NEGATIVE

## 2022-03-11 PROCEDURE — 99308 SBSQ NF CARE LOW MDM 20: CPT | Performed by: NURSE PRACTITIONER

## 2022-03-11 NOTE — LETTER
"    3/11/2022        RE: Ciro Wells  [address]  [place] [address]        [facility]  [place] [facility] Record Number:  [id number]  Place of Service where encounter took place: [facility]  ([facility]) [[id number]]    HPI:    Ciro Wells is a 87 year old  (11/18/1934), who is being seen today for an episodic care visit at the above location. Today's concern is INR/Coumadin management for  Atrial flutter, history of PE, history of DVT  ROS/Subjective:  Bleeding Signs/Symptoms:  None  Thromboembolic Signs/Symptoms:  None  Medication Changes:  No  Dietary Changes:  Yes: now at [facility]  Activity Changes: Yes: now at [facility]  Bacterial/Viral Infection:  No  Missed Coumadin Doses:  Held 3/3  On ASA: No  Other Concerns:  No    OBJECTIVE:  BP (!) 141/50   Pulse 61   Temp 98.1  F (36.7  C)   Resp 18   Ht 1.854 m (6' 1\")   Wt 104.8 kg (231 lb)   SpO2 96%   BMI 30.48 kg/m    Last INR: 1.7 on 3/9/22  INR Today:  1.6  Current Dose:  Coumadin 4 mg daily  INR Flow sheet at [facility]:    ASSESSMENT:  1. Encounter for therapeutic drug monitoring    2. Long term (current) use of anticoagulants    3. History of deep venous thrombosis    4. S/P ablation of atrial flutter    5. History of pulmonary embolism      Subtherapeutic INR for goal of 2-3    PLAN/ORDERS:     New Dose: Coumadin 6 mg today, 5 mg Saturday and Sunday    Next INR: 3/14    Electronically signed by:  LORRAINE Che CNP           Sincerely,        LORRAINE Che CNP    "

## 2022-03-14 ENCOUNTER — TRANSITIONAL CARE UNIT VISIT (OUTPATIENT)
Dept: GERIATRICS | Facility: CLINIC | Age: 87
End: 2022-03-14
Payer: COMMERCIAL

## 2022-03-14 ENCOUNTER — LAB REQUISITION (OUTPATIENT)
Dept: LAB | Facility: CLINIC | Age: 87
End: 2022-03-14
Payer: COMMERCIAL

## 2022-03-14 VITALS
HEIGHT: 73 IN | SYSTOLIC BLOOD PRESSURE: 171 MMHG | TEMPERATURE: 97.8 F | OXYGEN SATURATION: 96 % | WEIGHT: 231 LBS | HEART RATE: 64 BPM | RESPIRATION RATE: 18 BRPM | BODY MASS INDEX: 30.62 KG/M2 | DIASTOLIC BLOOD PRESSURE: 62 MMHG

## 2022-03-14 DIAGNOSIS — D64.9 ANEMIA, UNSPECIFIED: ICD-10-CM

## 2022-03-14 DIAGNOSIS — Z98.890 S/P ABLATION OF ATRIAL FLUTTER: ICD-10-CM

## 2022-03-14 DIAGNOSIS — R53.81 PHYSICAL DECONDITIONING: ICD-10-CM

## 2022-03-14 DIAGNOSIS — I10 ESSENTIAL HYPERTENSION: ICD-10-CM

## 2022-03-14 DIAGNOSIS — S72.001D CLOSED FRACTURE OF RIGHT HIP WITH ROUTINE HEALING, SUBSEQUENT ENCOUNTER: ICD-10-CM

## 2022-03-14 DIAGNOSIS — N18.31 STAGE 3A CHRONIC KIDNEY DISEASE (H): ICD-10-CM

## 2022-03-14 DIAGNOSIS — I42.9 CARDIOMYOPATHY, UNSPECIFIED TYPE (H): ICD-10-CM

## 2022-03-14 DIAGNOSIS — Z86.711 HISTORY OF PULMONARY EMBOLISM: ICD-10-CM

## 2022-03-14 DIAGNOSIS — I25.10 CORONARY ARTERY DISEASE INVOLVING NATIVE CORONARY ARTERY OF NATIVE HEART WITHOUT ANGINA PECTORIS: ICD-10-CM

## 2022-03-14 DIAGNOSIS — D62 ACUTE BLOOD LOSS ANEMIA: ICD-10-CM

## 2022-03-14 DIAGNOSIS — N18.9 CHRONIC KIDNEY DISEASE, UNSPECIFIED: ICD-10-CM

## 2022-03-14 DIAGNOSIS — I50.21 ACUTE HFREF (HEART FAILURE WITH REDUCED EJECTION FRACTION) (H): ICD-10-CM

## 2022-03-14 DIAGNOSIS — I27.20 PULMONARY HYPERTENSION (H): ICD-10-CM

## 2022-03-14 DIAGNOSIS — I21.4 NSTEMI (NON-ST ELEVATED MYOCARDIAL INFARCTION) (H): Primary | ICD-10-CM

## 2022-03-14 DIAGNOSIS — Z86.79 S/P ABLATION OF ATRIAL FLUTTER: ICD-10-CM

## 2022-03-14 DIAGNOSIS — Z79.01 CHRONIC ANTICOAGULATION: ICD-10-CM

## 2022-03-14 DIAGNOSIS — U07.1 COVID-19: ICD-10-CM

## 2022-03-14 DIAGNOSIS — Z86.718 HISTORY OF DEEP VENOUS THROMBOSIS: ICD-10-CM

## 2022-03-14 PROCEDURE — U0003 INFECTIOUS AGENT DETECTION BY NUCLEIC ACID (DNA OR RNA); SEVERE ACUTE RESPIRATORY SYNDROME CORONAVIRUS 2 (SARS-COV-2) (CORONAVIRUS DISEASE [COVID-19]), AMPLIFIED PROBE TECHNIQUE, MAKING USE OF HIGH THROUGHPUT TECHNOLOGIES AS DESCRIBED BY CMS-2020-01-R: HCPCS | Performed by: NURSE PRACTITIONER

## 2022-03-14 PROCEDURE — 99309 SBSQ NF CARE MODERATE MDM 30: CPT | Performed by: NURSE PRACTITIONER

## 2022-03-14 RX ORDER — POTASSIUM CHLORIDE 1500 MG/1
20 TABLET, EXTENDED RELEASE ORAL DAILY
Start: 2022-03-14

## 2022-03-14 RX ORDER — LISINOPRIL 5 MG/1
5 TABLET ORAL DAILY
Start: 2022-03-14 | End: 2022-03-14

## 2022-03-14 RX ORDER — LISINOPRIL 5 MG/1
10 TABLET ORAL DAILY
Status: ON HOLD
Start: 2022-03-14 | End: 2023-02-25

## 2022-03-14 NOTE — PROGRESS NOTES
[facility] GERIATRICS    Chief Complaint   Patient presents with     RECHECK     HPI:  Ciro Wells is a 87 year old  (11/18/1934), who is being seen today for an episodic care visit at: [facility]  ([facility]) [[id number]].     Past medical history is significant for history of DVT on Coumadin, atrial arrhythmia status post ablation August 30, 2019, hypertension, cerebellar neoplasm requiring surgery 2002, prostate cancer s/p prostatectomy.     Summary of recent hospitalization:  Ciro Wells was hospitalized at [facility] from February 19 through March 2, 2022.  He presented to the emergency department via EMS after he was found down on the ground in his garage for about 5 hours. Upon presentation to ED he was found to have elevated WBC 19.7, elevated lactate 2.4, tachycardia 120-130s on admission, elevated troponin 1.42-->2.51-->30.92. BNP elevated to 2534 on 2/21. CTA of chest revealed no PE, interlobular septal thickening and groundglass alveolar opacity affecting the right lung much more than the left. Trace right pleural effusion. Findings are likely on the basis of asymmetric edema and possible superimposed aspiration pneumonitis on the right. Step and legionella urine AG were negative. Blood cultures were negative. He was treated for sepsis secondary to aspiration pneumonia with ceftriaxone and azithromycin. He did require oxygen which was weaned off prior to hospital discharge. Cardiology consulted on admission due to elevated troponin, tachycardia. ECHO 2/20/22 showed EF severely reduced to 25%, cannot comment on WMA, mildly increased left ventricular wall thickness, RV is suboptimally visualized (grossly normal size with reduced function, left atrial chamber dimension is mildly enlarged, trace mitral valve regurgitation, moderate tricuspid valve regurgitation, Mild pulmonary hypertension, proximal ascending aorta is borderline dilated. Repeat ECHO 2/2022 with improved EF 40-45%,  "which is improved from previous, distal inferior and apical walls hypokinetic, which has has improved from previous. Underwent coronary angiogram 2/22 that showed Minimal non-obstructive CAD, Possible stress cardiomyopathy variant, type II NSTEMI in setting of recent fall / hip fracture. Started on metoprolol ER, lasix dose was adjusted. PTA lisinopril and chlorthalidone were discontinued. Follow up with cardiology outpatient. Ortho was also consulted on admission. Xray of pelvis revealed \"Oblique impacted fracture involving the right femoral neck. The right femoral shaft is retracted proximally and impacted upon the right femoral neck. No dislocation of the femoral/acetabular articulation which demonstrates moderate degenerative change. Moderate to degenerative changes left hip. Degenerative changes both SI joints. Surgical clips in the pelvis\" and he is status post posterior hip placement on February 21. He was started on vitamin d supplementation (vitamin d level low at 24). Should follow up with ortho on 3/7 for incision check. Discharged to TCU for physical rehabilitation and medical management.     Today's concern is: Seen today for routine follow up at TCU. Had visit with cardiology on 3/10/22. Lasix dose was increased to 40 mg daily at this appointment, lisinopril also started. Quinten reports that is he feeling well. Denies any dizziness/lightheadedness, SOB, CP. Reports ongoing swelling to legs- most in Right leg at time of visit. Denies pain to right hip. Reports bowels moving well. Denies dysuria, trouble voiding. Continues to work with therapy and is making progress. INR today 2.0    Allergies, and PMH/PSH reviewed in EPIC today.  REVIEW OF SYSTEMS:  10 point ROS of systems including Constitutional, Eyes, Respiratory, Cardiovascular, Gastroenterology, Genitourinary, Integumentary, Musculoskeletal, Psychiatric were all negative except for pertinent positives noted in my HPI.    Objective:   BP (!) 171/62   " "Pulse 64   Temp 97.8  F (36.6  C)   Resp 18   Ht 1.854 m (6' 1\")   Wt 104.8 kg (231 lb)   SpO2 96%   BMI 30.48 kg/m    GENERAL APPEARANCE:  Alert, in NAD  HEENT: normocephalic, moist mucous membranes, nose without drainage or crusting  RESP:  respiratory effort normal, no respiratory distress, Lung sounds clear, patient is on RA  CV: auscultation of heart done, rate and rhythm regular.  ABDOMEN: + bowel sounds, soft, nontender, no grimacing or guarding with palpation.  M/S: +2 edema to right lower extremity only  SKIN:  Inspection and palpation of skin and subcutaneous tissue: skin warm, dry without rashes  NEURO: cranial nerves 2-12 grossly intact and at patient's baseline; moves extremities freely  PSYCH: oriented x 3, affect and mood normal    Labs done in SNF are in [place] EPIC. Please refer to them using [employer]Care Everywhere. and Recent labs in EPIC reviewed by me today.     Assessment/Plan:  (I21.4) NSTEMI (non-ST elevated myocardial infarction) (H)  (primary encounter diagnosis)  (I50.21) Acute HFrEF (heart failure with reduced ejection fraction) (H)  (I42.9) Cardiomyopathy, unspecified type (H)  (I27.20) Pulmonary hypertension (H)  (I25.10) Coronary artery disease involving native coronary artery of native heart without angina pectoris  (I10) Essential hypertension  Comment: Found to have elevated troponin on admission to hospital elevated troponin 1.42-->2.51-->30.92  -cardiology consulted   -ECHO 2/20/22 showed EF severely reduced to 25%, cannot comment on WMA, mildly increased left ventricular wall thickness, RV is suboptimally visualized (grossly normal size with reduced function, left atrial chamber dimension is mildly enlarged, trace mitral valve regurgitation, moderate tricuspid valve regurgitation, Mild pulmonary hypertension, proximal ascending aorta is borderline dilated.   -Repeat ECHO 2/22/2022 with improved EF 40-45%, which is improved from previous, distal inferior and apical walls " hypokinetic, which has has improved from previous.   -Underwent coronary angiogram 2/22 that showed Minimal non-obstructive CAD, Possible stress cardiomyopathy variant, type II NSTEMI in setting of recent fall / hip fracture.   -had follow up with cardiology on 3/10- increased lasix to 40 mg daily and restarted lisinopril 5 mg daily.  -denies CP today.   -appears euvolemic today, limited edema to surgical leg only, no SOB, weight trending down (down 10 lb since admission)  -BP elevated at times 171/62, 160/63, 134/69  -PTA lisinopril and chlorthalidone discontinued in hospital, started on metoprolol ER and lasix  Plan: Increase lisinopril to 10 mg daily. Continue lasix 40 mg daily, metoprolol ER 50 mg daily. BMP tomorrow. TG shapes on in AM, off in PM. VS per policy. Adjust medications as needed. Daily weights. Notify provider with weight gain >2 lbs in a day, >5 lbs in on week. 2 gram Na diet. Follow up with cardiology per recommendations with ECHO in 3 months.     (Z98.890, Z86.79) S/p ablation 8/30/2019  (Z86.718) History of deep venous thrombosis  (Z86.711) History of PE  (Z79.01) Chronic anticoagulation  Comment: HR tachycardic on admission to hospital  -CTA on admission negative for PE  -metoprolol XL started in hospital  -INR today 2.0, therapeutic for goal 2-3  -PTA dosing was coumadin 5 mg daily  -HR at TCU controlled: 64, 55, 66  Plan: Continue metoprolol ER 50 mg daily. Coumadin 5 mg daily. INR 3/17. VS per policy. Adjust medications as needed. Follow up with cardiology.     (J69.0) Aspiration pneumonia, unspecified aspiration pneumonia type, unspecified laterality, unspecified part of lung (H)-resolved  (J96.01) Acute respiratory failure with hypoxia (H)-resolved  Comment: found to be septic on admission to hospital WBC 19.7, elevated lactate 2.4.  -CTA of chest revealed no PE, interlobular septal thickening and groundglass alveolar opacity affecting the right lung much more than the left. Trace right  pleural effusion. Findings are likely on the basis of asymmetric edema and possible superimposed aspiration pneumonitis on the right.   -Step and legionella urine AG were negative.   -Blood cultures were negative.   -He was treated for sepsis secondary to aspiration pneumonia with ceftriaxone and azithromycin.   -Oxygen was weaned off during hospitalization  -is afebrile and hemodynamically stable. Denies SOB, cough  Plan: Continue IS 10 reps QID with assist. Monitor respiratory status.     (N18.31) Stage 3a chronic kidney disease(H)  Comment: with KEM during hospitalization suspected to be secondary to intraoperative hypotension, contrast from CTA, possible congestion from CHF  -creatinine improving at hospital discharge was 1.28 on 2/27  -baseline creatinine around 1.2  -creatinine at last check at baseline        Creatinine   Date Value Ref Range Status   03/07/2022 1.07 0.66 - 1.25 mg/dL Final      Plan: BMP tomorrow (with facility issues and not able to check today as previously ordered). Avoid nephrotoxins. Renally dose medications as indicated.     (S72.001D) Closed fracture of right hip with routine healing, subsequent encounter  Comment: s/p posterior right hip placement on February 21  -pain currently managed   Plan: Continue tylenol PRN. Continue warfarin for DVT prophy. Continue vitamin d supplementation. WBAT. Therapy as below. Follow up with ortho per recommendations.  Follow up with PCP for osteoporosis work up outpatient      (D62) Post op anemia  Comment: Hgb at last check on 2/24 was 8.1, down for admission hgb of 13.5  -no s.sx of bleeding today        Hemoglobin   Date Value Ref Range Status   03/07/2022 9.2 (L) 13.3 - 17.7 g/dL Final      Plan: CBC tomorrow (with facility issues and not able to check today as previously ordered).       (R53.81) Physical deconditioning  Comment: Acute, secondary to recent hospitalization, medical conditions as above  -continues to work with therapy.  Plan:  Encourage participation in physical therapy/occupational therapy for strengthening and deconditioning. Discharge planning per their recommendation. Social work to assist with d/c planning.         Electronically signed by: LORRAINE Che CNP

## 2022-03-14 NOTE — LETTER
3/14/2022        RE: Ciro Wells  [address]  [place] [address]        [facility]    Chief Complaint   Patient presents with     RECHECK     HPI:  Ciro Wells is a 87 year old  (11/18/1934), who is being seen today for an episodic care visit at: [facility]  ([facility]) [[id number]].     Past medical history is significant for history of DVT on Coumadin, atrial arrhythmia status post ablation August 30, 2019, hypertension, cerebellar neoplasm requiring surgery 2002, prostate cancer s/p prostatectomy.     Summary of recent hospitalization:  Ciro Wells was hospitalized at [facility] from February 19 through March 2, 2022.  He presented to the emergency department via EMS after he was found down on the ground in his garage for about 5 hours. Upon presentation to ED he was found to have elevated WBC 19.7, elevated lactate 2.4, tachycardia 120-130s on admission, elevated troponin 1.42-->2.51-->30.92. BNP elevated to 2534 on 2/21. CTA of chest revealed no PE, interlobular septal thickening and groundglass alveolar opacity affecting the right lung much more than the left. Trace right pleural effusion. Findings are likely on the basis of asymmetric edema and possible superimposed aspiration pneumonitis on the right. Step and legionella urine AG were negative. Blood cultures were negative. He was treated for sepsis secondary to aspiration pneumonia with ceftriaxone and azithromycin. He did require oxygen which was weaned off prior to hospital discharge. Cardiology consulted on admission due to elevated troponin, tachycardia. ECHO 2/20/22 showed EF severely reduced to 25%, cannot comment on WMA, mildly increased left ventricular wall thickness, RV is suboptimally visualized (grossly normal size with reduced function, left atrial chamber dimension is mildly enlarged, trace mitral valve regurgitation, moderate tricuspid valve regurgitation, Mild pulmonary  "hypertension, proximal ascending aorta is borderline dilated. Repeat ECHO 2/2022 with improved EF 40-45%, which is improved from previous, distal inferior and apical walls hypokinetic, which has has improved from previous. Underwent coronary angiogram 2/22 that showed Minimal non-obstructive CAD, Possible stress cardiomyopathy variant, type II NSTEMI in setting of recent fall / hip fracture. Started on metoprolol ER, lasix dose was adjusted. PTA lisinopril and chlorthalidone were discontinued. Follow up with cardiology outpatient. Ortho was also consulted on admission. Xray of pelvis revealed \"Oblique impacted fracture involving the right femoral neck. The right femoral shaft is retracted proximally and impacted upon the right femoral neck. No dislocation of the femoral/acetabular articulation which demonstrates moderate degenerative change. Moderate to degenerative changes left hip. Degenerative changes both SI joints. Surgical clips in the pelvis\" and he is status post posterior hip placement on February 21. He was started on vitamin d supplementation (vitamin d level low at 24). Should follow up with ortho on 3/7 for incision check. Discharged to TCU for physical rehabilitation and medical management.     Today's concern is: Seen today for routine follow up at TCU. Had visit with cardiology on 3/10/22. Lasix dose was increased to 40 mg daily at this appointment, lisinopril also started. Quinten reports that is he feeling well. Denies any dizziness/lightheadedness, SOB, CP. Reports ongoing swelling to legs- most in Right leg at time of visit. Denies pain to right hip. Reports bowels moving well. Denies dysuria, trouble voiding. Continues to work with therapy and is making progress. INR today 2.0    Allergies, and PMH/PSH reviewed in EPIC today.  REVIEW OF SYSTEMS:  10 point ROS of systems including Constitutional, Eyes, Respiratory, Cardiovascular, Gastroenterology, Genitourinary, Integumentary, Musculoskeletal, " "Psychiatric were all negative except for pertinent positives noted in my HPI.    Objective:   BP (!) 171/62   Pulse 64   Temp 97.8  F (36.6  C)   Resp 18   Ht 1.854 m (6' 1\")   Wt 104.8 kg (231 lb)   SpO2 96%   BMI 30.48 kg/m    GENERAL APPEARANCE:  Alert, in NAD  HEENT: normocephalic, moist mucous membranes, nose without drainage or crusting  RESP:  respiratory effort normal, no respiratory distress, Lung sounds clear, patient is on RA  CV: auscultation of heart done, rate and rhythm regular.  ABDOMEN: + bowel sounds, soft, nontender, no grimacing or guarding with palpation.  M/S: +2 edema to right lower extremity only  SKIN:  Inspection and palpation of skin and subcutaneous tissue: skin warm, dry without rashes  NEURO: cranial nerves 2-12 grossly intact and at patient's baseline; moves extremities freely  PSYCH: oriented x 3, affect and mood normal    Labs done in SNF are in [facility]. Please refer to them using [facility] Everywhere. and Recent labs in EPIC reviewed by me today.     Assessment/Plan:  (I21.4) NSTEMI (non-ST elevated myocardial infarction) (H)  (primary encounter diagnosis)  (I50.21) Acute HFrEF (heart failure with reduced ejection fraction) (H)  (I42.9) Cardiomyopathy, unspecified type (H)  (I27.20) Pulmonary hypertension (H)  (I25.10) Coronary artery disease involving native coronary artery of native heart without angina pectoris  (I10) Essential hypertension  Comment: Found to have elevated troponin on admission to hospital elevated troponin 1.42-->2.51-->30.92  -cardiology consulted   -ECHO 2/20/22 showed EF severely reduced to 25%, cannot comment on WMA, mildly increased left ventricular wall thickness, RV is suboptimally visualized (grossly normal size with reduced function, left atrial chamber dimension is mildly enlarged, trace mitral valve regurgitation, moderate tricuspid valve regurgitation, Mild pulmonary hypertension, proximal ascending aorta is borderline dilated.   -Repeat " ECHO 2/22/2022 with improved EF 40-45%, which is improved from previous, distal inferior and apical walls hypokinetic, which has has improved from previous.   -Underwent coronary angiogram 2/22 that showed Minimal non-obstructive CAD, Possible stress cardiomyopathy variant, type II NSTEMI in setting of recent fall / hip fracture.   -had follow up with cardiology on 3/10- increased lasix to 40 mg daily and restarted lisinopril 5 mg daily.  -denies CP today.   -appears euvolemic today, limited edema to surgical leg only, no SOB, weight trending down (down 10 lb since admission)  -BP elevated at times 171/62, 160/63, 134/69  -PTA lisinopril and chlorthalidone discontinued in hospital, started on metoprolol ER and lasix  Plan: Increase lisinopril to 10 mg daily. Continue lasix 40 mg daily, metoprolol ER 50 mg daily. BMP tomorrow. TG shapes on in AM, off in PM. VS per policy. Adjust medications as needed. Daily weights. Notify provider with weight gain >2 lbs in a day, >5 lbs in on week. 2 gram Na diet. Follow up with cardiology per recommendations with ECHO in 3 months.     (Z98.890, Z86.79) S/p ablation 8/30/2019  (Z86.718) History of deep venous thrombosis  (Z86.711) History of PE  (Z79.01) Chronic anticoagulation  Comment: HR tachycardic on admission to hospital  -CTA on admission negative for PE  -metoprolol XL started in hospital  -INR today 2.0, therapeutic for goal 2-3  -PTA dosing was coumadin 5 mg daily  -HR at TCU controlled: 64, 55, 66  Plan: Continue metoprolol ER 50 mg daily. Coumadin 5 mg daily. INR 3/17. VS per policy. Adjust medications as needed. Follow up with cardiology.     (J69.0) Aspiration pneumonia, unspecified aspiration pneumonia type, unspecified laterality, unspecified part of lung (H)-resolved  (J96.01) Acute respiratory failure with hypoxia (H)-resolved  Comment: found to be septic on admission to hospital WBC 19.7, elevated lactate 2.4.  -CTA of chest revealed no PE, interlobular septal  thickening and groundglass alveolar opacity affecting the right lung much more than the left. Trace right pleural effusion. Findings are likely on the basis of asymmetric edema and possible superimposed aspiration pneumonitis on the right.   -Step and legionella urine AG were negative.   -Blood cultures were negative.   -He was treated for sepsis secondary to aspiration pneumonia with ceftriaxone and azithromycin.   -Oxygen was weaned off during hospitalization  -is afebrile and hemodynamically stable. Denies SOB, cough  Plan: Continue IS 10 reps QID with assist. Monitor respiratory status.     (N18.31) Stage 3a chronic kidney disease(H)  Comment: with KEM during hospitalization suspected to be secondary to intraoperative hypotension, contrast from CTA, possible congestion from CHF  -creatinine improving at hospital discharge was 1.28 on 2/27  -baseline creatinine around 1.2  -creatinine at last check at baseline        Creatinine   Date Value Ref Range Status   03/07/2022 1.07 0.66 - 1.25 mg/dL Final      Plan: BMP tomorrow (with facility issues and not able to check today as previously ordered). Avoid nephrotoxins. Renally dose medications as indicated.     (S72.001D) Closed fracture of right hip with routine healing, subsequent encounter  Comment: s/p posterior right hip placement on February 21  -pain currently managed   Plan: Continue tylenol PRN. Continue warfarin for DVT prophy. Continue vitamin d supplementation. WBAT. Therapy as below. Follow up with ortho per recommendations.  Follow up with PCP for osteoporosis work up outpatient      (D62) Post op anemia  Comment: Hgb at last check on 2/24 was 8.1, down for admission hgb of 13.5  -no s.sx of bleeding today        Hemoglobin   Date Value Ref Range Status   03/07/2022 9.2 (L) 13.3 - 17.7 g/dL Final      Plan: CBC tomorrow (with facility issues and not able to check today as previously ordered).       (R53.81) Physical deconditioning  Comment: Acute,  secondary to recent hospitalization, medical conditions as above  -continues to work with therapy.  Plan: Encourage participation in physical therapy/occupational therapy for strengthening and deconditioning. Discharge planning per their recommendation. Social work to assist with d/c planning.         Electronically signed by: LORRAINE Che CNP             Sincerely,        LORRAINE Che CNP

## 2022-03-15 LAB
ANION GAP SERPL CALCULATED.3IONS-SCNC: 7 MMOL/L (ref 3–14)
BASOPHILS # BLD AUTO: 0.1 10E3/UL (ref 0–0.2)
BASOPHILS NFR BLD AUTO: 1 %
BUN SERPL-MCNC: 27 MG/DL (ref 7–30)
CALCIUM SERPL-MCNC: 9.2 MG/DL (ref 8.5–10.1)
CHLORIDE BLD-SCNC: 108 MMOL/L (ref 94–109)
CO2 SERPL-SCNC: 25 MMOL/L (ref 20–32)
CREAT SERPL-MCNC: 1.17 MG/DL (ref 0.66–1.25)
EOSINOPHIL # BLD AUTO: 0.3 10E3/UL (ref 0–0.7)
EOSINOPHIL NFR BLD AUTO: 6 %
ERYTHROCYTE [DISTWIDTH] IN BLOOD BY AUTOMATED COUNT: 14.6 % (ref 10–15)
GFR SERPL CREATININE-BSD FRML MDRD: 60 ML/MIN/1.73M2
GLUCOSE BLD-MCNC: 95 MG/DL (ref 70–99)
HCT VFR BLD AUTO: 33 % (ref 40–53)
HGB BLD-MCNC: 9.9 G/DL (ref 13.3–17.7)
IMM GRANULOCYTES # BLD: 0 10E3/UL
IMM GRANULOCYTES NFR BLD: 0 %
LYMPHOCYTES # BLD AUTO: 1.2 10E3/UL (ref 0.8–5.3)
LYMPHOCYTES NFR BLD AUTO: 22 %
MCH RBC QN AUTO: 28.9 PG (ref 26.5–33)
MCHC RBC AUTO-ENTMCNC: 30 G/DL (ref 31.5–36.5)
MCV RBC AUTO: 97 FL (ref 78–100)
MONOCYTES # BLD AUTO: 0.6 10E3/UL (ref 0–1.3)
MONOCYTES NFR BLD AUTO: 10 %
NEUTROPHILS # BLD AUTO: 3.2 10E3/UL (ref 1.6–8.3)
NEUTROPHILS NFR BLD AUTO: 61 %
NRBC # BLD AUTO: 0 10E3/UL
NRBC BLD AUTO-RTO: 0 /100
PLATELET # BLD AUTO: 385 10E3/UL (ref 150–450)
POTASSIUM BLD-SCNC: 4.4 MMOL/L (ref 3.4–5.3)
RBC # BLD AUTO: 3.42 10E6/UL (ref 4.4–5.9)
SARS-COV-2 RNA RESP QL NAA+PROBE: NEGATIVE
SODIUM SERPL-SCNC: 140 MMOL/L (ref 133–144)
WBC # BLD AUTO: 5.4 10E3/UL (ref 4–11)

## 2022-03-15 PROCEDURE — 80048 BASIC METABOLIC PNL TOTAL CA: CPT | Performed by: NURSE PRACTITIONER

## 2022-03-15 PROCEDURE — 85025 COMPLETE CBC W/AUTO DIFF WBC: CPT | Performed by: NURSE PRACTITIONER

## 2022-03-15 PROCEDURE — P9604 ONE-WAY ALLOW PRORATED TRIP: HCPCS | Performed by: NURSE PRACTITIONER

## 2022-03-15 PROCEDURE — 36415 COLL VENOUS BLD VENIPUNCTURE: CPT | Performed by: NURSE PRACTITIONER

## 2022-03-16 ENCOUNTER — DISCHARGE SUMMARY NURSING HOME (OUTPATIENT)
Dept: GERIATRICS | Facility: CLINIC | Age: 87
End: 2022-03-16
Payer: COMMERCIAL

## 2022-03-16 VITALS
WEIGHT: 228.2 LBS | RESPIRATION RATE: 18 BRPM | SYSTOLIC BLOOD PRESSURE: 143 MMHG | BODY MASS INDEX: 30.24 KG/M2 | HEART RATE: 66 BPM | DIASTOLIC BLOOD PRESSURE: 52 MMHG | TEMPERATURE: 98.2 F | HEIGHT: 73 IN | OXYGEN SATURATION: 94 %

## 2022-03-16 DIAGNOSIS — R53.81 PHYSICAL DECONDITIONING: ICD-10-CM

## 2022-03-16 DIAGNOSIS — D62 ACUTE BLOOD LOSS ANEMIA: ICD-10-CM

## 2022-03-16 DIAGNOSIS — I10 ESSENTIAL HYPERTENSION: ICD-10-CM

## 2022-03-16 DIAGNOSIS — I42.9 CARDIOMYOPATHY, UNSPECIFIED TYPE (H): ICD-10-CM

## 2022-03-16 DIAGNOSIS — Z79.01 CHRONIC ANTICOAGULATION: ICD-10-CM

## 2022-03-16 DIAGNOSIS — S72.001D CLOSED FRACTURE OF RIGHT HIP WITH ROUTINE HEALING, SUBSEQUENT ENCOUNTER: ICD-10-CM

## 2022-03-16 DIAGNOSIS — I21.4 NSTEMI (NON-ST ELEVATED MYOCARDIAL INFARCTION) (H): Primary | ICD-10-CM

## 2022-03-16 DIAGNOSIS — N18.31 STAGE 3A CHRONIC KIDNEY DISEASE (H): ICD-10-CM

## 2022-03-16 DIAGNOSIS — Z86.718 HISTORY OF DEEP VENOUS THROMBOSIS: ICD-10-CM

## 2022-03-16 DIAGNOSIS — I25.10 CORONARY ARTERY DISEASE INVOLVING NATIVE CORONARY ARTERY OF NATIVE HEART WITHOUT ANGINA PECTORIS: ICD-10-CM

## 2022-03-16 DIAGNOSIS — Z86.79 S/P ABLATION OF ATRIAL FLUTTER: ICD-10-CM

## 2022-03-16 DIAGNOSIS — Z86.711 HISTORY OF PULMONARY EMBOLISM: ICD-10-CM

## 2022-03-16 DIAGNOSIS — I27.20 PULMONARY HYPERTENSION (H): ICD-10-CM

## 2022-03-16 DIAGNOSIS — I50.21 ACUTE HFREF (HEART FAILURE WITH REDUCED EJECTION FRACTION) (H): ICD-10-CM

## 2022-03-16 DIAGNOSIS — Z98.890 S/P ABLATION OF ATRIAL FLUTTER: ICD-10-CM

## 2022-03-16 PROCEDURE — 99316 NF DSCHRG MGMT 30 MIN+: CPT | Performed by: NURSE PRACTITIONER

## 2022-03-16 NOTE — PROGRESS NOTES
[facility] GERIATRICS DISCHARGE SUMMARY  PATIENT'S NAME: Ciro Wells  YOB: 1934  MEDICAL RECORD NUMBER:  [id number]  Place of Service where encounter took place:  [facility]  ([facility]) [[id number]]    PRIMARY CARE PROVIDER AND CLINIC RESPONSIBLE AFTER TRANSFER:   Maximo Granados MD, [facility] [address] [facility] [address]    Non-[facility] Provider     Transferring providers: LORRAINE Che CNP, Casie Escobar MD  Recent Hospitalization/ED:  Hospital  [facility]  stay 2/19/22 to 3/2/22.  Date of SNF Admission: March 02, 2022  Date of SNF (anticipated) Discharge: March 17, 2022  Discharged to: previous independent home  Cognitive Scores: SLUMS: 28/30 and CPT: 5.1/5.6  Physical Function: Ambulating 100 ft with walker SBA; moderate assist for LB dressing, SBA for upper body dressing and hygiene, CGA for transfers, completed 2 sets of 2 steps SBA    CODE STATUS/ADVANCE DIRECTIVES DISCUSSION:  Full Code   ALLERGIES: Other environmental allergy    NURSING FACILITY COURSE   Medication Changes/Rationale:     Lasix dose increased to 40 mg daily by cardiology    Started on lisinopril by cardiology and dose increased due to elevated BP    Started on potassium by cardiology    Summary of nursing facility stay:   Past medical history is significant for history of DVT on Coumadin, atrial arrhythmia status post ablation August 30, 2019, hypertension, cerebellar neoplasm requiring surgery 2002, prostate cancer s/p prostatectomy.     Summary of recent hospitalization:  Ciro Wells was hospitalized at [facility] from February 19 through March 2, 2022.  He presented to the emergency department via EMS after he was found down on the ground in his garage for about 5 hours. Upon presentation to ED he was found to have elevated WBC 19.7, elevated lactate 2.4, tachycardia 120-130s on admission, elevated troponin 1.42-->2.51-->30.92. BNP elevated to 2534 on 2/21. CTA of  "chest revealed no PE, interlobular septal thickening and groundglass alveolar opacity affecting the right lung much more than the left. Trace right pleural effusion. Findings are likely on the basis of asymmetric edema and possible superimposed aspiration pneumonitis on the right. Step and legionella urine AG were negative. Blood cultures were negative. He was treated for sepsis secondary to aspiration pneumonia with ceftriaxone and azithromycin. He did require oxygen which was weaned off prior to hospital discharge. Cardiology consulted on admission due to elevated troponin, tachycardia. ECHO 2/20/22 showed EF severely reduced to 25%, cannot comment on WMA, mildly increased left ventricular wall thickness, RV is suboptimally visualized (grossly normal size with reduced function, left atrial chamber dimension is mildly enlarged, trace mitral valve regurgitation, moderate tricuspid valve regurgitation, Mild pulmonary hypertension, proximal ascending aorta is borderline dilated. Repeat ECHO 2/2022 with improved EF 40-45%, which is improved from previous, distal inferior and apical walls hypokinetic, which has has improved from previous. Underwent coronary angiogram 2/22 that showed Minimal non-obstructive CAD, Possible stress cardiomyopathy variant, type II NSTEMI in setting of recent fall / hip fracture. Started on metoprolol ER, lasix dose was adjusted. PTA lisinopril and chlorthalidone were discontinued. Follow up with cardiology outpatient. Ortho was also consulted on admission. Xray of pelvis revealed \"Oblique impacted fracture involving the right femoral neck. The right femoral shaft is retracted proximally and impacted upon the right femoral neck. No dislocation of the femoral/acetabular articulation which demonstrates moderate degenerative change. Moderate to degenerative changes left hip. Degenerative changes both SI joints. Surgical clips in the pelvis\" and he is status post posterior hip placement on " February 21. He was started on vitamin d supplementation (vitamin d level low at 24). Should follow up with ortho on 3/7 for incision check. Discharged to TCU for physical rehabilitation and medical management.      While in TCU patient met goals with therapy. He will be discharging back home. Will continue therapy through home care. He has INR check tomorrow prior to discharge and it should be rechecked by home care RN on 3/21. Would recommend CBC, BMP be checked at follow up with PCP. He should follow up with PCP in 1-2 weeks. Follow up with ortho per their recommendations. ECHO should be completed in 3 months and follow up with cardiology per their recommendations.     Specific problems addressed at TCU:   (I21.4) NSTEMI (non-ST elevated myocardial infarction) (H)  (primary encounter diagnosis)  (I50.21) Acute HFrEF (heart failure with reduced ejection fraction) (H)  (I42.9) Cardiomyopathy, unspecified type (H)  (I27.20) Pulmonary hypertension (H)  (I25.10) Coronary artery disease involving native coronary artery of native heart without angina pectoris  (I10) Essential hypertension  Comment: Found to have elevated troponin on admission to hospital elevated troponin 1.42-->2.51-->30.92  -cardiology consulted   -ECHO 2/20/22 showed EF severely reduced to 25%, cannot comment on WMA, mildly increased left ventricular wall thickness, RV is suboptimally visualized (grossly normal size with reduced function, left atrial chamber dimension is mildly enlarged, trace mitral valve regurgitation, moderate tricuspid valve regurgitation, Mild pulmonary hypertension, proximal ascending aorta is borderline dilated.   -Repeat ECHO 2/22/2022 with improved EF 40-45%, which is improved from previous, distal inferior and apical walls hypokinetic, which has has improved from previous.   -Underwent coronary angiogram 2/22 that showed Minimal non-obstructive CAD, Possible stress cardiomyopathy variant, type II NSTEMI in setting of recent  fall / hip fracture.   -had follow up with cardiology on 3/10- increased lasix to 40 mg daily and restarted lisinopril 5 mg daily.  -lisinopril dose increased 3/14  -denies CP today.   -appears euvolemic today, limited edema to surgical leg only, no SOB, weight trending down (down about 10 lb since admission)  -BP elevated at times 166/50, 143/52, 153/79  -PTA lisinopril and chlorthalidone discontinued in hospital, started on metoprolol ER and lasix  Plan: Continue lisinopril 10 mg daily, lasix 40 mg daily, metoprolol ER 50 mg daily. BMP at follow up with PCP. TG shapes on in AM, off in PM. VS per policy. Adjust medications as needed. Daily weights. Notify provider with weight gain >2 lbs in a day, >5 lbs in on week. 2 gram Na diet. Follow up with cardiology per recommendations with ECHO in 3 months.     (Z98.890, Z86.79) S/p ablation 8/30/2019  (Z86.718) History of deep venous thrombosis  (Z86.711) History of PE  (Z79.01) Chronic anticoagulation  Comment: HR tachycardic on admission to hospital  -CTA on admission negative for PE  -metoprolol XL started in hospital  -INR today 2.0, therapeutic for goal 2-3  -PTA dosing was coumadin 5 mg daily  -HR at TCU controlled: 62, 66, 64  Plan: Continue metoprolol ER 50 mg daily. Coumadin 5 mg daily. INR 3/17 and then 3/21 with home care RN. VS per policy. Adjust medications as needed. Follow up with cardiology.     (J69.0) Aspiration pneumonia, unspecified aspiration pneumonia type, unspecified laterality, unspecified part of lung (H)-resolved  (J96.01) Acute respiratory failure with hypoxia (H)-resolved  Comment: found to be septic on admission to hospital WBC 19.7, elevated lactate 2.4.  -CTA of chest revealed no PE, interlobular septal thickening and groundglass alveolar opacity affecting the right lung much more than the left. Trace right pleural effusion. Findings are likely on the basis of asymmetric edema and possible superimposed aspiration pneumonitis on the right.    -Step and legionella urine AG were negative.   -Blood cultures were negative.   -He was treated for sepsis secondary to aspiration pneumonia with ceftriaxone and azithromycin.   -Oxygen was weaned off during hospitalization  -is afebrile and hemodynamically stable. Denies SOB, cough  Plan: Continue IS 10 reps QID with assist. Monitor respiratory status.     (N18.31) Stage 3a chronic kidney disease(H)  Comment: with KEM during hospitalization suspected to be secondary to intraoperative hypotension, contrast from CTA, possible congestion from CHF  -creatinine improving at hospital discharge was 1.28 on 2/27  -baseline creatinine around 1.2  -creatinine at last check at baseline  Creatinine   Date Value Ref Range Status   03/15/2022 1.17 0.66 - 1.25 mg/dL Final      Plan: BMP at follow up with PCP. Avoid nephrotoxins. Renally dose medications as indicated.     (S72.001D) Closed fracture of right hip with routine healing, subsequent encounter  Comment: s/p posterior right hip placement on February 21  -pain currently managed   Plan: Continue tylenol PRN. Continue warfarin for DVT prophy. Continue vitamin d supplementation. WBAT. Therapy as below. Follow up with ortho per recommendations.  Follow up with PCP for osteoporosis work up outpatient      (D62) Post op anemia  Comment: Hgb at last check on 2/24 was 8.1, down for admission hgb of 13.5  -no s.sx of bleeding today  Hemoglobin   Date Value Ref Range Status   03/15/2022 9.9 (L) 13.3 - 17.7 g/dL Final   03/07/2022 9.2 (L) 13.3 - 17.7 g/dL Final      Plan: CBC at follow up with PCP     (R53.81) Physical deconditioning  Comment: Acute, secondary to recent hospitalization, medical conditions as above  -met goals with therapy.  Plan: Continue therapy through home care      Discharge Medications:  Current Outpatient Medications   Medication Sig Dispense Refill     acetaminophen (TYLENOL) 325 MG tablet Take 650 mg by mouth every 4 hours as needed       cholecalciferol 25  "MCG (1000 UT) TABS Take 1,000 Units by mouth daily       furosemide (LASIX) 20 MG tablet Take 40 mg by mouth daily       lisinopril (ZESTRIL) 5 MG tablet Take 2 tablets (10 mg) by mouth daily       metoprolol succinate ER (TOPROL-XL) 100 MG 24 hr tablet Take 50 mg by mouth daily       Multiple Vitamins-Minerals (CENTRUM SILVER 50+MEN PO) Take 1 tablet by mouth daily       potassium chloride ER (K-TAB) 20 MEQ CR tablet Take 1 tablet (20 mEq) by mouth daily       triamcinolone (KENALOG) 0.1 % external cream Apply topically See Admin Instructions Apply to calves topically as needed for dermalitis apply to scaly pink areas on calves 1-2 x daily until smooth as needed       Warfarin Therapy Reminder 1 each continuous prn (per INR)          Controlled medications:   not applicable/none     Past Medical History: History reviewed. No pertinent past medical history.  Physical Exam:   Vitals: BP (!) 143/52   Pulse 66   Temp 98.2  F (36.8  C)   Resp 18   Ht 1.854 m (6' 1\")   Wt 103.5 kg (228 lb 3.2 oz)   SpO2 94%   BMI 30.11 kg/m    BMI: Body mass index is 30.11 kg/m .  GENERAL APPEARANCE:  Alert, in NAD  HEENT: normocephalic, moist mucous membranes, nose without drainage or crusting  RESP:  respiratory effort normal, no respiratory distress, Lung sounds clear, patient is on RA  CV: auscultation of heart done, rate and rhythm regular.  ABDOMEN: + bowel sounds, soft, nontender, no grimacing or guarding with palpation.  M/S: generalized edema to right lower extremity only  NEURO: cranial nerves 2-12 grossly intact and at patient's baseline; moves extremities freely  PSYCH: oriented x 3, affect and mood normal    SNF labs: Labs done in SNF are in [place] EPIC. Please refer to them using [facility] Everywhere. and Recent labs in EPIC reviewed by me today.     DISCHARGE PLAN:    Follow up labs: INR due 3/21 to be drawn by home care with results to PCP; Recommend CBC and BMP be drawn at follow up with PCP    Medical Follow " Up:      Follow up with primary care provider in 1-2 weeks   Follow up with specialist: ortho per recommendations; cardiology with ECHO in 3 months       Discharge Services: Home Care:  Occupational Therapy, Physical Therapy, Registered Nurse, Home Health Aide,  and From:  [facility]    Discharge Instructions:     Weight bearing restrictions:  Weight bearing as tolerated.     Continue to follow your diet:  2 gram sodium.     Weigh yourself daily in the morning and keep a record. Call your primary clinic: a) if you are more short of breath, or b) if your weight changes more than 3 pounds in one day or more than 5 pounds in one week.     TG shapes on in AM, off in PM    Frequent repositioning recommended; float heels when in bed    Wound care to buttocks: : 1.Cleanse skin with Cavilon skin cleanser 2. Apply Cavilon barrier spray to buttocks BID 3. Leave VINAY 4. Turn side to side. Avoid supine position.    Wound care to abrasion on knee and elbow: 1. Cleanse wounds BID using normal saline 2. Apply Cavilon barrier spray 3. Apply Mepilex dressings. Change every 2 days or if soiled    Notify your surgeon if you have increased redness, swelling, tenderness, or drainage at your incision site.     DO NOT DRIVE while taking narcotic pain medications.     Driving is not recommended until cleared by ortho or PCP to resume.     TOTAL DISCHARGE TIME:   Greater than 30 minutes  Electronically signed by:  LORRAINE Che CNP     Documentation of Face to Face and Certification for Home Health Services    I certify that patient: Ciro Wells is under my care and that I, or a nurse practitioner or physician's assistant working with me, had a face-to-face encounter that meets the physician face-to-face encounter requirements with this patient on: 3/16/2022.    This encounter with the patient was in whole, or in part, for the following medical condition, which is the primary reason for home health care:    1. NSTEMI (non-ST elevated myocardial infarction) (H)    2. Acute HFrEF (heart failure with reduced ejection fraction) (H)    3. Pulmonary hypertension (H)    4. Cardiomyopathy, unspecified type (H)    5. Coronary artery disease involving native coronary artery of native heart without angina pectoris    6. S/P ablation of atrial flutter    7. Essential hypertension    8. History of pulmonary embolism    9. History of deep venous thrombosis    10. Chronic anticoagulation    11. Stage 3a chronic kidney disease (H)    12. Closed fracture of right hip with routine healing, subsequent encounter    13. Acute blood loss anemia    14. Physical deconditioning      .    I certify that, based on my findings, the following services are medically necessary home health services: Nursing, Occupational Therapy, Physical Therapy, Social Work and HHA.    My clinical findings support the need for the above services because: Nurse is needed: to provide medication management and to check INR 3/21 with results to PCP, Occupational Therapy Services are needed to assess and treat cognitive ability and address ADL safety due to impairment in completing ADLs safely and independently as he returns home from TCU stay. and Physical Therapy Services are needed to assess and treat the following functional impairments: generalized weakness and deconditioning.  to provide additional community resources to patient.    Further, I certify that my clinical findings support that this patient is homebound (i.e. absences from home require considerable and taxing effort and are for medical reasons or [facility] services or infrequently or of short duration when for other reasons) because: Requires assistance of another person or specialized equipment to access medical services because patient: Requires supervision of another for safe transfer...    Based on the above findings. I certify that this patient is confined to the home and needs  intermittent skilled nursing care, physical therapy and/or speech therapy.  The patient is under my care, and I have initiated the establishment of the plan of care.  This patient will be followed by a physician who will periodically review the plan of care.  Physician/Provider to provide follow up care: Maximo Granados    Attending hospital physician (the Medicare certified [facility] provider): Casie Escobar MD  Date: 3/16/2022

## 2022-03-16 NOTE — LETTER
3/16/2022        RE: Ciro Wells  [address]  [place] [address]        [facility] GERIATRICS DISCHARGE SUMMARY  PATIENT'S NAME: Ciro Wells  YOB: 1934  MEDICAL RECORD NUMBER:  [id number]  Place of Service where encounter took place:  [facility]  ([facility]) [[id number]]    PRIMARY CARE PROVIDER AND CLINIC RESPONSIBLE AFTER TRANSFER:   Maximo Granados MD, [facility] [address] [facility] [id number]    Non-[facility] Provider     Transferring providers: LORRAINE Che CNP, Casie Escobar MD  Recent Hospitalization/ED:  Hospital  [facility]  stay 2/19/22 to 3/2/22.  Date of SNF Admission: March 02, 2022  Date of SNF (anticipated) Discharge: March 17, 2022  Discharged to: previous independent home  Cognitive Scores: SLUMS: 28/30 and CPT: 5.1/5.6  Physical Function: Ambulating 100 ft with walker SBA; moderate assist for LB dressing, SBA for upper body dressing and hygiene, CGA for transfers, completed 2 sets of 2 steps SBA    CODE STATUS/ADVANCE DIRECTIVES DISCUSSION:  Full Code   ALLERGIES: Other environmental allergy    NURSING FACILITY COURSE   Medication Changes/Rationale:     Lasix dose increased to 40 mg daily by cardiology    Started on lisinopril by cardiology and dose increased due to elevated BP    Started on potassium by cardiology    Summary of nursing facility stay:   Past medical history is significant for history of DVT on Coumadin, atrial arrhythmia status post ablation August 30, 2019, hypertension, cerebellar neoplasm requiring surgery 2002, prostate cancer s/p prostatectomy.     Summary of recent hospitalization:  Ciro Wells was hospitalized at [facility] from February 19 through March 2, 2022.  He presented to the emergency department via EMS after he was found down on the ground in his garage for about 5 hours. Upon presentation to ED he was found to have elevated WBC 19.7, elevated lactate 2.4, tachycardia 120-130s on  "admission, elevated troponin 1.42-->2.51-->30.92. BNP elevated to 2534 on 2/21. CTA of chest revealed no PE, interlobular septal thickening and groundglass alveolar opacity affecting the right lung much more than the left. Trace right pleural effusion. Findings are likely on the basis of asymmetric edema and possible superimposed aspiration pneumonitis on the right. Step and legionella urine AG were negative. Blood cultures were negative. He was treated for sepsis secondary to aspiration pneumonia with ceftriaxone and azithromycin. He did require oxygen which was weaned off prior to hospital discharge. Cardiology consulted on admission due to elevated troponin, tachycardia. ECHO 2/20/22 showed EF severely reduced to 25%, cannot comment on WMA, mildly increased left ventricular wall thickness, RV is suboptimally visualized (grossly normal size with reduced function, left atrial chamber dimension is mildly enlarged, trace mitral valve regurgitation, moderate tricuspid valve regurgitation, Mild pulmonary hypertension, proximal ascending aorta is borderline dilated. Repeat ECHO 2/2022 with improved EF 40-45%, which is improved from previous, distal inferior and apical walls hypokinetic, which has has improved from previous. Underwent coronary angiogram 2/22 that showed Minimal non-obstructive CAD, Possible stress cardiomyopathy variant, type II NSTEMI in setting of recent fall / hip fracture. Started on metoprolol ER, lasix dose was adjusted. PTA lisinopril and chlorthalidone were discontinued. Follow up with cardiology outpatient. Ortho was also consulted on admission. Xray of pelvis revealed \"Oblique impacted fracture involving the right femoral neck. The right femoral shaft is retracted proximally and impacted upon the right femoral neck. No dislocation of the femoral/acetabular articulation which demonstrates moderate degenerative change. Moderate to degenerative changes left hip. Degenerative changes both SI " "joints. Surgical clips in the pelvis\" and he is status post posterior hip placement on February 21. He was started on vitamin d supplementation (vitamin d level low at 24). Should follow up with ortho on 3/7 for incision check. Discharged to TCU for physical rehabilitation and medical management.      While in TCU patient met goals with therapy. He will be discharging back home. Will continue therapy through home care. He has INR check tomorrow prior to discharge and it should be rechecked by home care RN on 3/21. Would recommend CBC, BMP be checked at follow up with PCP. He should follow up with PCP in 1-2 weeks. Follow up with ortho per their recommendations. ECHO should be completed in 3 months and follow up with cardiology per their recommendations.     Specific problems addressed at TCU:   (I21.4) NSTEMI (non-ST elevated myocardial infarction) (H)  (primary encounter diagnosis)  (I50.21) Acute HFrEF (heart failure with reduced ejection fraction) (H)  (I42.9) Cardiomyopathy, unspecified type (H)  (I27.20) Pulmonary hypertension (H)  (I25.10) Coronary artery disease involving native coronary artery of native heart without angina pectoris  (I10) Essential hypertension  Comment: Found to have elevated troponin on admission to hospital elevated troponin 1.42-->2.51-->30.92  -cardiology consulted   -ECHO 2/20/22 showed EF severely reduced to 25%, cannot comment on WMA, mildly increased left ventricular wall thickness, RV is suboptimally visualized (grossly normal size with reduced function, left atrial chamber dimension is mildly enlarged, trace mitral valve regurgitation, moderate tricuspid valve regurgitation, Mild pulmonary hypertension, proximal ascending aorta is borderline dilated.   -Repeat ECHO 2/22/2022 with improved EF 40-45%, which is improved from previous, distal inferior and apical walls hypokinetic, which has has improved from previous.   -Underwent coronary angiogram 2/22 that showed Minimal " non-obstructive CAD, Possible stress cardiomyopathy variant, type II NSTEMI in setting of recent fall / hip fracture.   -had follow up with cardiology on 3/10- increased lasix to 40 mg daily and restarted lisinopril 5 mg daily.  -lisinopril dose increased 3/14  -denies CP today.   -appears euvolemic today, limited edema to surgical leg only, no SOB, weight trending down (down about 10 lb since admission)  -BP elevated at times 166/50, 143/52, 153/79  -PTA lisinopril and chlorthalidone discontinued in hospital, started on metoprolol ER and lasix  Plan: Continue lisinopril 10 mg daily, lasix 40 mg daily, metoprolol ER 50 mg daily. BMP at follow up with PCP. TG shapes on in AM, off in PM. VS per policy. Adjust medications as needed. Daily weights. Notify provider with weight gain >2 lbs in a day, >5 lbs in on week. 2 gram Na diet. Follow up with cardiology per recommendations with ECHO in 3 months.     (Z98.890, Z86.79) S/p ablation 8/30/2019  (Z86.718) History of deep venous thrombosis  (Z86.711) History of PE  (Z79.01) Chronic anticoagulation  Comment: HR tachycardic on admission to hospital  -CTA on admission negative for PE  -metoprolol XL started in hospital  -INR today 2.0, therapeutic for goal 2-3  -PTA dosing was coumadin 5 mg daily  -HR at TCU controlled: 62, 66, 64  Plan: Continue metoprolol ER 50 mg daily. Coumadin 5 mg daily. INR 3/17 and then 3/21 with home care RN. VS per policy. Adjust medications as needed. Follow up with cardiology.     (J69.0) Aspiration pneumonia, unspecified aspiration pneumonia type, unspecified laterality, unspecified part of lung (H)-resolved  (J96.01) Acute respiratory failure with hypoxia (H)-resolved  Comment: found to be septic on admission to hospital WBC 19.7, elevated lactate 2.4.  -CTA of chest revealed no PE, interlobular septal thickening and groundglass alveolar opacity affecting the right lung much more than the left. Trace right pleural effusion. Findings are likely  on the basis of asymmetric edema and possible superimposed aspiration pneumonitis on the right.   -Step and legionella urine AG were negative.   -Blood cultures were negative.   -He was treated for sepsis secondary to aspiration pneumonia with ceftriaxone and azithromycin.   -Oxygen was weaned off during hospitalization  -is afebrile and hemodynamically stable. Denies SOB, cough  Plan: Continue IS 10 reps QID with assist. Monitor respiratory status.     (N18.31) Stage 3a chronic kidney disease(H)  Comment: with KEM during hospitalization suspected to be secondary to intraoperative hypotension, contrast from CTA, possible congestion from CHF  -creatinine improving at hospital discharge was 1.28 on 2/27  -baseline creatinine around 1.2  -creatinine at last check at baseline  Creatinine   Date Value Ref Range Status   03/15/2022 1.17 0.66 - 1.25 mg/dL Final      Plan: BMP at follow up with PCP. Avoid nephrotoxins. Renally dose medications as indicated.     (S72.001D) Closed fracture of right hip with routine healing, subsequent encounter  Comment: s/p posterior right hip placement on February 21  -pain currently managed   Plan: Continue tylenol PRN. Continue warfarin for DVT prophy. Continue vitamin d supplementation. WBAT. Therapy as below. Follow up with ortho per recommendations.  Follow up with PCP for osteoporosis work up outpatient      (D62) Post op anemia  Comment: Hgb at last check on 2/24 was 8.1, down for admission hgb of 13.5  -no s.sx of bleeding today  Hemoglobin   Date Value Ref Range Status   03/15/2022 9.9 (L) 13.3 - 17.7 g/dL Final   03/07/2022 9.2 (L) 13.3 - 17.7 g/dL Final      Plan: CBC at follow up with PCP     (R53.81) Physical deconditioning  Comment: Acute, secondary to recent hospitalization, medical conditions as above  -met goals with therapy.  Plan: Continue therapy through home care      Discharge Medications:  Current Outpatient Medications   Medication Sig Dispense Refill      "acetaminophen (TYLENOL) 325 MG tablet Take 650 mg by mouth every 4 hours as needed       cholecalciferol 25 MCG (1000 UT) TABS Take 1,000 Units by mouth daily       furosemide (LASIX) 20 MG tablet Take 40 mg by mouth daily       lisinopril (ZESTRIL) 5 MG tablet Take 2 tablets (10 mg) by mouth daily       metoprolol succinate ER (TOPROL-XL) 100 MG 24 hr tablet Take 50 mg by mouth daily       Multiple Vitamins-Minerals (CENTRUM SILVER 50+MEN PO) Take 1 tablet by mouth daily       potassium chloride ER (K-TAB) 20 MEQ CR tablet Take 1 tablet (20 mEq) by mouth daily       triamcinolone (KENALOG) 0.1 % external cream Apply topically See Admin Instructions Apply to calves topically as needed for dermalitis apply to scaly pink areas on calves 1-2 x daily until smooth as needed       Warfarin Therapy Reminder 1 each continuous prn (per INR)          Controlled medications:   not applicable/none     Past Medical History: History reviewed. No pertinent past medical history.  Physical Exam:   Vitals: BP (!) 143/52   Pulse 66   Temp 98.2  F (36.8  C)   Resp 18   Ht 1.854 m (6' 1\")   Wt 103.5 kg (228 lb 3.2 oz)   SpO2 94%   BMI 30.11 kg/m    BMI: Body mass index is 30.11 kg/m .  GENERAL APPEARANCE:  Alert, in NAD  HEENT: normocephalic, moist mucous membranes, nose without drainage or crusting  RESP:  respiratory effort normal, no respiratory distress, Lung sounds clear, patient is on RA  CV: auscultation of heart done, rate and rhythm regular.  ABDOMEN: + bowel sounds, soft, nontender, no grimacing or guarding with palpation.  M/S: generalized edema to right lower extremity only  NEURO: cranial nerves 2-12 grossly intact and at patient's baseline; moves extremities freely  PSYCH: oriented x 3, affect and mood normal    SNF labs: Labs done in SNF are in [facility]. Please refer to them using EPIC/Care Everywhere. and Recent labs in EPIC reviewed by me today.     DISCHARGE PLAN:    Follow up labs: INR due 3/21 to be drawn " by home care with results to PCP; Recommend CBC and BMP be drawn at follow up with PCP    Medical Follow Up:      Follow up with primary care provider in 1-2 weeks   Follow up with specialist: ortho per recommendations; cardiology with ECHO in 3 months       Discharge Services: Home Care:  Occupational Therapy, Physical Therapy, Registered Nurse, Home Health Aide,  and From:  [facility]    Discharge Instructions:     Weight bearing restrictions:  Weight bearing as tolerated.     Continue to follow your diet:  2 gram sodium.     Weigh yourself daily in the morning and keep a record. Call your primary clinic: a) if you are more short of breath, or b) if your weight changes more than 3 pounds in one day or more than 5 pounds in one week.     TG shapes on in AM, off in PM    Frequent repositioning recommended; float heels when in bed    Wound care to buttocks: : 1.Cleanse skin with Cavilon skin cleanser 2. Apply Cavilon barrier spray to buttocks BID 3. Leave VINAY 4. Turn side to side. Avoid supine position.    Wound care to abrasion on knee and elbow: 1. Cleanse wounds BID using normal saline 2. Apply Cavilon barrier spray 3. Apply Mepilex dressings. Change every 2 days or if soiled    Notify your surgeon if you have increased redness, swelling, tenderness, or drainage at your incision site.     DO NOT DRIVE while taking narcotic pain medications.     Driving is not recommended until cleared by ortho or PCP to resume.     TOTAL DISCHARGE TIME:   Greater than 30 minutes  Electronically signed by:  LORRAINE Che CNP     Documentation of Face to Face and Certification for Home Health Services    I certify that patient: Ciro Wells is under my care and that I, or a nurse practitioner or physician's assistant working with me, had a face-to-face encounter that meets the physician face-to-face encounter requirements with this patient on: 3/16/2022.    This encounter with the patient was in  whole, or in part, for the following medical condition, which is the primary reason for home health care:   1. NSTEMI (non-ST elevated myocardial infarction) (H)    2. Acute HFrEF (heart failure with reduced ejection fraction) (H)    3. Pulmonary hypertension (H)    4. Cardiomyopathy, unspecified type (H)    5. Coronary artery disease involving native coronary artery of native heart without angina pectoris    6. S/P ablation of atrial flutter    7. Essential hypertension    8. History of pulmonary embolism    9. History of deep venous thrombosis    10. Chronic anticoagulation    11. Stage 3a chronic kidney disease (H)    12. Closed fracture of right hip with routine healing, subsequent encounter    13. Acute blood loss anemia    14. Physical deconditioning      .    I certify that, based on my findings, the following services are medically necessary home health services: Nursing, Occupational Therapy, Physical Therapy, Social Work and HHA.    My clinical findings support the need for the above services because: Nurse is needed: to provide medication management and to check INR 3/21 with results to PCP, Occupational Therapy Services are needed to assess and treat cognitive ability and address ADL safety due to impairment in completing ADLs safely and independently as he returns home from TCU stay. and Physical Therapy Services are needed to assess and treat the following functional impairments: generalized weakness and deconditioning.  to provide additional community resources to patient.    Further, I certify that my clinical findings support that this patient is homebound (i.e. absences from home require considerable and taxing effort and are for medical reasons or [employer] services or infrequently or of short duration when for other reasons) because: Requires assistance of another person or specialized equipment to access medical services because patient: Requires supervision of another for safe  transfer...    Based on the above findings. I certify that this patient is confined to the home and needs intermittent skilled nursing care, physical therapy and/or speech therapy.  The patient is under my care, and I have initiated the establishment of the plan of care.  This patient will be followed by a physician who will periodically review the plan of care.  Physician/Provider to provide follow up care: Maximo Granados    Attending hospital physician (the Medicare certified [facility] provider): Casie Escobar MD  Date: 3/16/2022                  Sincerely,        LORRAINE Che CNP

## 2022-03-17 ENCOUNTER — TRANSITIONAL CARE UNIT VISIT (OUTPATIENT)
Dept: GERIATRICS | Facility: CLINIC | Age: 87
End: 2022-03-17
Payer: COMMERCIAL

## 2022-03-17 DIAGNOSIS — Z86.79 S/P ABLATION OF ATRIAL FLUTTER: Primary | ICD-10-CM

## 2022-03-17 DIAGNOSIS — Z86.711 HISTORY OF PULMONARY EMBOLISM: ICD-10-CM

## 2022-03-17 DIAGNOSIS — Z79.01 LONG TERM (CURRENT) USE OF ANTICOAGULANTS: ICD-10-CM

## 2022-03-17 DIAGNOSIS — Z86.718 HISTORY OF DEEP VENOUS THROMBOSIS: ICD-10-CM

## 2022-03-17 DIAGNOSIS — Z51.81 ENCOUNTER FOR THERAPEUTIC DRUG MONITORING: ICD-10-CM

## 2022-03-17 DIAGNOSIS — Z98.890 S/P ABLATION OF ATRIAL FLUTTER: Primary | ICD-10-CM

## 2022-03-17 PROCEDURE — 99308 SBSQ NF CARE LOW MDM 20: CPT | Performed by: NURSE PRACTITIONER

## 2022-03-17 NOTE — PROGRESS NOTES
[facility] GERIATRICS  [place] Medical Record Number:  [id number]  Place of Service where encounter took place: [facility] ([facility]) [[id number]]    HPI:  Ciro Wells is a 87 year old  (11/18/1934), who is being seen today for an episodic care visit at the above location. Today's concern is INR/Coumadin management for S/p ablation 8/30/2019; History of deep venous thrombosis; History of PE    ROS/Subjective:  Bleeding Signs/Symptoms:  None  Thromboembolic Signs/Symptoms:  None  Medication Changes:  No  Dietary Changes:  No  Activity Changes: No  Bacterial/Viral Infection:  No  Missed Coumadin Doses: Over 1 week ago held on 3/3  On ASA: No  Other Concerns:  No    OBJECTIVE:  There were no vitals taken for this visit.  Last INR: 2.0 on 3/14  INR Today:  2.7  Current Dose:  Coumadin 5 mg daily  INR Flow sheet at [facility]:    ASSESSMENT:  1. S/P ablation of atrial flutter    2. History of pulmonary embolism    3. History of deep venous thrombosis    4. Encounter for therapeutic drug monitoring    5. Long term (current) use of anticoagulants      Therapeutic INR for goal of 2-3    PLAN/ORDERS:   New Dose: Coumadin 5 mg Thursday and Saturday and 4 mg rest of days   Next INR: 3/21 with home care RN and results to PCP      Electronically signed by:  LORRAINE Che CNP

## 2022-03-17 NOTE — LETTER
3/17/2022        RE: Ciro Wells  [address]  [place] [address]        [facility]  [place] [facility] Record Number:  [id number]  Place of Service where encounter took place: [facility] ([facility]) [[id number]]    HPI:  Ciro Wells is a 87 year old  (11/18/1934), who is being seen today for an episodic care visit at the above location. Today's concern is INR/Coumadin management for S/p ablation 8/30/2019; History of deep venous thrombosis; History of PE    ROS/Subjective:  Bleeding Signs/Symptoms:  None  Thromboembolic Signs/Symptoms:  None  Medication Changes:  No  Dietary Changes:  No  Activity Changes: No  Bacterial/Viral Infection:  No  Missed Coumadin Doses: Over 1 week ago held on 3/3  On ASA: No  Other Concerns:  No    OBJECTIVE:  There were no vitals taken for this visit.  Last INR: 2.0 on 3/14  INR Today:  2.7  Current Dose:  Coumadin 5 mg daily  INR Flow sheet at [facility]:    ASSESSMENT:  1. S/P ablation of atrial flutter    2. History of pulmonary embolism    3. History of deep venous thrombosis    4. Encounter for therapeutic drug monitoring    5. Long term (current) use of anticoagulants      Therapeutic INR for goal of 2-3    PLAN/ORDERS:   New Dose: Coumadin 5 mg Thursday and Saturday and 4 mg rest of days   Next INR: 3/21 with home care RN and results to PCP      Electronically signed by:  LORRAINE Che CNP         Sincerely,        LORRAINE Che CNP

## 2023-02-21 ENCOUNTER — HOSPITAL ENCOUNTER (OUTPATIENT)
Facility: CLINIC | Age: 88
Setting detail: OBSERVATION
Discharge: HOME | End: 2023-02-25
Attending: EMERGENCY MEDICINE | Admitting: INTERNAL MEDICINE
Payer: COMMERCIAL

## 2023-02-21 ENCOUNTER — APPOINTMENT (OUTPATIENT)
Dept: CT IMAGING | Facility: CLINIC | Age: 88
End: 2023-02-21
Attending: EMERGENCY MEDICINE
Payer: COMMERCIAL

## 2023-02-21 ENCOUNTER — APPOINTMENT (OUTPATIENT)
Dept: GENERAL RADIOLOGY | Facility: CLINIC | Age: 88
End: 2023-02-21
Attending: EMERGENCY MEDICINE
Payer: COMMERCIAL

## 2023-02-21 DIAGNOSIS — I10 BENIGN ESSENTIAL HYPERTENSION: Primary | ICD-10-CM

## 2023-02-21 DIAGNOSIS — I50.9 ACUTE ON CHRONIC CONGESTIVE HEART FAILURE, UNSPECIFIED HEART FAILURE TYPE (H): ICD-10-CM

## 2023-02-21 DIAGNOSIS — E83.42 HYPOMAGNESEMIA: ICD-10-CM

## 2023-02-21 DIAGNOSIS — M62.81 GENERALIZED MUSCLE WEAKNESS: ICD-10-CM

## 2023-02-21 DIAGNOSIS — Z20.822 SUSPECTED 2019 NOVEL CORONAVIRUS INFECTION: ICD-10-CM
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
## 2023-02-21 LAB
ALBUMIN SERPL BCG-MCNC: 4.1 G/DL (ref 3.5–5.2)
ALBUMIN UR-MCNC: 70 MG/DL
ALP SERPL-CCNC: 91 U/L (ref 40–129)
ALT SERPL W P-5'-P-CCNC: 16 U/L (ref 10–50)
ANION GAP SERPL CALCULATED.3IONS-SCNC: 12 MMOL/L (ref 7–15)
APPEARANCE UR: CLEAR
AST SERPL W P-5'-P-CCNC: 19 U/L (ref 10–50)
BASOPHILS # BLD AUTO: 0 10E3/UL (ref 0–0.2)
BASOPHILS NFR BLD AUTO: 0 %
BILIRUB SERPL-MCNC: 0.9 MG/DL
BILIRUB UR QL STRIP: NEGATIVE
BUN SERPL-MCNC: 24.2 MG/DL (ref 8–23)
CALCIUM SERPL-MCNC: 9.2 MG/DL (ref 8.8–10.2)
CHLORIDE SERPL-SCNC: 100 MMOL/L (ref 98–107)
CK SERPL-CCNC: 112 U/L (ref 39–308)
COLOR UR AUTO: ABNORMAL
CREAT SERPL-MCNC: 1.22 MG/DL (ref 0.67–1.17)
DEPRECATED HCO3 PLAS-SCNC: 26 MMOL/L (ref 22–29)
EOSINOPHIL # BLD AUTO: 0 10E3/UL (ref 0–0.7)
EOSINOPHIL NFR BLD AUTO: 0 %
ERYTHROCYTE [DISTWIDTH] IN BLOOD BY AUTOMATED COUNT: 13.5 % (ref 10–15)
GFR SERPL CREATININE-BSD FRML MDRD: 57 ML/MIN/1.73M2
GLUCOSE SERPL-MCNC: 115 MG/DL (ref 70–99)
GLUCOSE UR STRIP-MCNC: NEGATIVE MG/DL
HCT VFR BLD AUTO: 40.7 % (ref 40–53)
HGB BLD-MCNC: 13.3 G/DL (ref 13.3–17.7)
HGB UR QL STRIP: ABNORMAL
HYALINE CASTS: 1 /LPF
IMM GRANULOCYTES # BLD: 0 10E3/UL
IMM GRANULOCYTES NFR BLD: 0 %
INR PPP: 1.72 (ref 0.85–1.15)
KETONES UR STRIP-MCNC: NEGATIVE MG/DL
LEUKOCYTE ESTERASE UR QL STRIP: NEGATIVE
LYMPHOCYTES # BLD AUTO: 0.5 10E3/UL (ref 0.8–5.3)
LYMPHOCYTES NFR BLD AUTO: 6 %
MAGNESIUM SERPL-MCNC: 1.6 MG/DL (ref 1.7–2.3)
MCH RBC QN AUTO: 30.2 PG (ref 26.5–33)
MCHC RBC AUTO-ENTMCNC: 32.7 G/DL (ref 31.5–36.5)
MCV RBC AUTO: 92 FL (ref 78–100)
MONOCYTES # BLD AUTO: 0.6 10E3/UL (ref 0–1.3)
MONOCYTES NFR BLD AUTO: 7 %
MUCOUS THREADS #/AREA URNS LPF: PRESENT /LPF
NEUTROPHILS # BLD AUTO: 8.1 10E3/UL (ref 1.6–8.3)
NEUTROPHILS NFR BLD AUTO: 87 %
NITRATE UR QL: NEGATIVE
NRBC # BLD AUTO: 0 10E3/UL
NRBC BLD AUTO-RTO: 0 /100
NT-PROBNP SERPL-MCNC: 1791 PG/ML (ref 0–1800)
PH UR STRIP: 5.5 [PH] (ref 5–7)
PLATELET # BLD AUTO: 228 10E3/UL (ref 150–450)
POTASSIUM SERPL-SCNC: 4.3 MMOL/L (ref 3.4–5.3)
PROT SERPL-MCNC: 6.8 G/DL (ref 6.4–8.3)
RBC # BLD AUTO: 4.41 10E6/UL (ref 4.4–5.9)
RBC URINE: 1 /HPF
SODIUM SERPL-SCNC: 138 MMOL/L (ref 136–145)
SP GR UR STRIP: 1.02 (ref 1–1.03)
SQUAMOUS EPITHELIAL: <1 /HPF
TROPONIN T SERPL HS-MCNC: 42 NG/L
TROPONIN T SERPL HS-MCNC: 44 NG/L
UROBILINOGEN UR STRIP-MCNC: NORMAL MG/DL
WBC # BLD AUTO: 9.4 10E3/UL (ref 4–11)
WBC URINE: <1 /HPF

## 2023-02-21 PROCEDURE — C9803 HOPD COVID-19 SPEC COLLECT: HCPCS

## 2023-02-21 PROCEDURE — 83880 ASSAY OF NATRIURETIC PEPTIDE: CPT | Performed by: EMERGENCY MEDICINE

## 2023-02-21 PROCEDURE — 93005 ELECTROCARDIOGRAM TRACING: CPT

## 2023-02-21 PROCEDURE — 70450 CT HEAD/BRAIN W/O DYE: CPT

## 2023-02-21 PROCEDURE — 72170 X-RAY EXAM OF PELVIS: CPT

## 2023-02-21 PROCEDURE — 36415 COLL VENOUS BLD VENIPUNCTURE: CPT | Performed by: EMERGENCY MEDICINE

## 2023-02-21 PROCEDURE — 81003 URINALYSIS AUTO W/O SCOPE: CPT | Performed by: EMERGENCY MEDICINE

## 2023-02-21 PROCEDURE — 82550 ASSAY OF CK (CPK): CPT | Performed by: EMERGENCY MEDICINE

## 2023-02-21 PROCEDURE — 85025 COMPLETE CBC W/AUTO DIFF WBC: CPT | Performed by: EMERGENCY MEDICINE

## 2023-02-21 PROCEDURE — 99285 EMERGENCY DEPT VISIT HI MDM: CPT | Mod: 25

## 2023-02-21 PROCEDURE — 71045 X-RAY EXAM CHEST 1 VIEW: CPT

## 2023-02-21 PROCEDURE — 85610 PROTHROMBIN TIME: CPT | Performed by: EMERGENCY MEDICINE

## 2023-02-21 PROCEDURE — 80053 COMPREHEN METABOLIC PANEL: CPT | Performed by: EMERGENCY MEDICINE

## 2023-02-21 PROCEDURE — 84484 ASSAY OF TROPONIN QUANT: CPT | Mod: 91 | Performed by: EMERGENCY MEDICINE

## 2023-02-21 PROCEDURE — 83735 ASSAY OF MAGNESIUM: CPT | Performed by: EMERGENCY MEDICINE

## 2023-02-21 PROCEDURE — 250N000011 HC RX IP 250 OP 636: Performed by: EMERGENCY MEDICINE

## 2023-02-21 PROCEDURE — 96365 THER/PROPH/DIAG IV INF INIT: CPT

## 2023-02-21 RX ORDER — MAGNESIUM SULFATE HEPTAHYDRATE 40 MG/ML
2 INJECTION, SOLUTION INTRAVENOUS ONCE
Status: COMPLETED | OUTPATIENT
Start: 2023-02-21 | End: 2023-02-22

## 2023-02-21 RX ADMIN — MAGNESIUM SULFATE HEPTAHYDRATE 2 G: 2 INJECTION, SOLUTION INTRAVENOUS at 23:06

## 2023-02-21 ASSESSMENT — ENCOUNTER SYMPTOMS
NUMBNESS: 0
LIGHT-HEADEDNESS: 0
FEVER: 0
BACK PAIN: 0
SHORTNESS OF BREATH: 0
WEAKNESS: 1
DIZZINESS: 0

## 2023-02-21 ASSESSMENT — ACTIVITIES OF DAILY LIVING (ADL)
ADLS_ACUITY_SCORE: 35
ADLS_ACUITY_SCORE: 33

## 2023-02-22 ENCOUNTER — APPOINTMENT (OUTPATIENT)
Dept: PHYSICAL THERAPY | Facility: CLINIC | Age: 88
End: 2023-02-22
Attending: INTERNAL MEDICINE
Payer: COMMERCIAL

## 2023-02-22 LAB
ANION GAP SERPL CALCULATED.3IONS-SCNC: 11 MMOL/L (ref 7–15)
ATRIAL RATE - MUSE: 92 BPM
BUN SERPL-MCNC: 26.3 MG/DL (ref 8–23)
CALCIUM SERPL-MCNC: 9.4 MG/DL (ref 8.8–10.2)
CHLORIDE SERPL-SCNC: 99 MMOL/L (ref 98–107)
CREAT SERPL-MCNC: 1.27 MG/DL (ref 0.67–1.17)
DEPRECATED HCO3 PLAS-SCNC: 30 MMOL/L (ref 22–29)
DIASTOLIC BLOOD PRESSURE - MUSE: NORMAL MMHG
ERYTHROCYTE [DISTWIDTH] IN BLOOD BY AUTOMATED COUNT: 13.4 % (ref 10–15)
FLUAV RNA SPEC QL NAA+PROBE: NEGATIVE
FLUBV RNA RESP QL NAA+PROBE: NEGATIVE
GFR SERPL CREATININE-BSD FRML MDRD: 54 ML/MIN/1.73M2
GLUCOSE SERPL-MCNC: 107 MG/DL (ref 70–99)
HCT VFR BLD AUTO: 39.2 % (ref 40–53)
HGB BLD-MCNC: 12.9 G/DL (ref 13.3–17.7)
INR PPP: 1.7 (ref 0.85–1.15)
INTERPRETATION ECG - MUSE: NORMAL
MAGNESIUM SERPL-MCNC: 2 MG/DL (ref 1.7–2.3)
MCH RBC QN AUTO: 30.3 PG (ref 26.5–33)
MCHC RBC AUTO-ENTMCNC: 32.9 G/DL (ref 31.5–36.5)
MCV RBC AUTO: 92 FL (ref 78–100)
P AXIS - MUSE: 82 DEGREES
PLATELET # BLD AUTO: 213 10E3/UL (ref 150–450)
POTASSIUM SERPL-SCNC: 3.8 MMOL/L (ref 3.4–5.3)
PR INTERVAL - MUSE: 186 MS
QRS DURATION - MUSE: 92 MS
QT - MUSE: 380 MS
QTC - MUSE: 469 MS
R AXIS - MUSE: 2 DEGREES
RBC # BLD AUTO: 4.26 10E6/UL (ref 4.4–5.9)
RSV RNA SPEC NAA+PROBE: NEGATIVE
SARS-COV-2 RNA RESP QL NAA+PROBE: POSITIVE
SODIUM SERPL-SCNC: 140 MMOL/L (ref 136–145)
SYSTOLIC BLOOD PRESSURE - MUSE: NORMAL MMHG
T AXIS - MUSE: 52 DEGREES
VENTRICULAR RATE- MUSE: 92 BPM
WBC # BLD AUTO: 7.4 10E3/UL (ref 4–11)

## 2023-02-22 PROCEDURE — G0378 HOSPITAL OBSERVATION PER HR: HCPCS

## 2023-02-22 PROCEDURE — 85027 COMPLETE CBC AUTOMATED: CPT | Performed by: INTERNAL MEDICINE

## 2023-02-22 PROCEDURE — 36415 COLL VENOUS BLD VENIPUNCTURE: CPT | Performed by: INTERNAL MEDICINE

## 2023-02-22 PROCEDURE — 80048 BASIC METABOLIC PNL TOTAL CA: CPT | Performed by: INTERNAL MEDICINE

## 2023-02-22 PROCEDURE — 258N000003 HC RX IP 258 OP 636: Performed by: INTERNAL MEDICINE

## 2023-02-22 PROCEDURE — G0378 HOSPITAL OBSERVATION PER HR: HCPCS | Mod: CS

## 2023-02-22 PROCEDURE — 83735 ASSAY OF MAGNESIUM: CPT | Performed by: INTERNAL MEDICINE

## 2023-02-22 PROCEDURE — 87637 SARSCOV2&INF A&B&RSV AMP PRB: CPT | Performed by: EMERGENCY MEDICINE

## 2023-02-22 PROCEDURE — 96367 TX/PROPH/DG ADDL SEQ IV INF: CPT

## 2023-02-22 PROCEDURE — 97161 PT EVAL LOW COMPLEX 20 MIN: CPT | Mod: GP | Performed by: PHYSICAL THERAPIST

## 2023-02-22 PROCEDURE — 96366 THER/PROPH/DIAG IV INF ADDON: CPT

## 2023-02-22 PROCEDURE — 250N000013 HC RX MED GY IP 250 OP 250 PS 637: Performed by: INTERNAL MEDICINE

## 2023-02-22 PROCEDURE — 250N000011 HC RX IP 250 OP 636: Performed by: INTERNAL MEDICINE

## 2023-02-22 PROCEDURE — 96374 THER/PROPH/DIAG INJ IV PUSH: CPT

## 2023-02-22 PROCEDURE — 99222 1ST HOSP IP/OBS MODERATE 55: CPT | Mod: AI | Performed by: INTERNAL MEDICINE

## 2023-02-22 PROCEDURE — 99207 PR NO CHARGE LOS: CPT | Performed by: INTERNAL MEDICINE

## 2023-02-22 PROCEDURE — 250N000011 HC RX IP 250 OP 636: Performed by: EMERGENCY MEDICINE

## 2023-02-22 PROCEDURE — 96375 TX/PRO/DX INJ NEW DRUG ADDON: CPT

## 2023-02-22 PROCEDURE — 85610 PROTHROMBIN TIME: CPT | Performed by: INTERNAL MEDICINE

## 2023-02-22 RX ORDER — ACETAMINOPHEN 650 MG/1
650 SUPPOSITORY RECTAL EVERY 6 HOURS PRN
Status: DISCONTINUED | OUTPATIENT
Start: 2023-02-22 | End: 2023-02-25 | Stop reason: HOSPADM

## 2023-02-22 RX ORDER — WARFARIN SODIUM 4 MG/1
TABLET ORAL
COMMUNITY
Start: 2022-12-22

## 2023-02-22 RX ORDER — ONDANSETRON 2 MG/ML
4 INJECTION INTRAMUSCULAR; INTRAVENOUS EVERY 6 HOURS PRN
Status: DISCONTINUED | OUTPATIENT
Start: 2023-02-22 | End: 2023-02-25 | Stop reason: HOSPADM

## 2023-02-22 RX ORDER — POTASSIUM CHLORIDE 1500 MG/1
20 TABLET, EXTENDED RELEASE ORAL DAILY
Status: DISCONTINUED | OUTPATIENT
Start: 2023-02-22 | End: 2023-02-25 | Stop reason: HOSPADM

## 2023-02-22 RX ORDER — FUROSEMIDE 10 MG/ML
40 INJECTION INTRAMUSCULAR; INTRAVENOUS ONCE
Status: COMPLETED | OUTPATIENT
Start: 2023-02-22 | End: 2023-02-22

## 2023-02-22 RX ORDER — WARFARIN SODIUM 3 MG/1
6 TABLET ORAL
Status: COMPLETED | OUTPATIENT
Start: 2023-02-22 | End: 2023-02-22

## 2023-02-22 RX ORDER — ACETAMINOPHEN 325 MG/1
650 TABLET ORAL EVERY 6 HOURS PRN
Status: DISCONTINUED | OUTPATIENT
Start: 2023-02-22 | End: 2023-02-25 | Stop reason: HOSPADM

## 2023-02-22 RX ORDER — AMOXICILLIN 250 MG
2 CAPSULE ORAL 2 TIMES DAILY PRN
Status: DISCONTINUED | OUTPATIENT
Start: 2023-02-22 | End: 2023-02-25 | Stop reason: HOSPADM

## 2023-02-22 RX ORDER — FUROSEMIDE 40 MG
40 TABLET ORAL DAILY
Status: DISCONTINUED | OUTPATIENT
Start: 2023-02-22 | End: 2023-02-25 | Stop reason: HOSPADM

## 2023-02-22 RX ORDER — AMOXICILLIN 250 MG
1 CAPSULE ORAL 2 TIMES DAILY PRN
Status: DISCONTINUED | OUTPATIENT
Start: 2023-02-22 | End: 2023-02-25 | Stop reason: HOSPADM

## 2023-02-22 RX ORDER — METOPROLOL SUCCINATE 50 MG/1
50 TABLET, EXTENDED RELEASE ORAL AT BEDTIME
Status: DISCONTINUED | OUTPATIENT
Start: 2023-02-22 | End: 2023-02-25 | Stop reason: HOSPADM

## 2023-02-22 RX ORDER — METOPROLOL SUCCINATE 50 MG/1
50 TABLET, EXTENDED RELEASE ORAL EVERY EVENING
COMMUNITY
Start: 2022-12-22

## 2023-02-22 RX ORDER — ONDANSETRON 4 MG/1
4 TABLET, ORALLY DISINTEGRATING ORAL EVERY 6 HOURS PRN
Status: DISCONTINUED | OUTPATIENT
Start: 2023-02-22 | End: 2023-02-25 | Stop reason: HOSPADM

## 2023-02-22 RX ORDER — LISINOPRIL 10 MG/1
10 TABLET ORAL AT BEDTIME
Status: DISCONTINUED | OUTPATIENT
Start: 2023-02-22 | End: 2023-02-24

## 2023-02-22 RX ADMIN — WARFARIN SODIUM 6 MG: 3 TABLET ORAL at 18:42

## 2023-02-22 RX ADMIN — LISINOPRIL 10 MG: 10 TABLET ORAL at 02:16

## 2023-02-22 RX ADMIN — SODIUM CHLORIDE 50 ML: 9 INJECTION, SOLUTION INTRAVENOUS at 13:46

## 2023-02-22 RX ADMIN — METOPROLOL SUCCINATE 50 MG: 50 TABLET, EXTENDED RELEASE ORAL at 02:16

## 2023-02-22 RX ADMIN — POTASSIUM CHLORIDE 20 MEQ: 1500 TABLET, EXTENDED RELEASE ORAL at 09:38

## 2023-02-22 RX ADMIN — METOPROLOL SUCCINATE 50 MG: 50 TABLET, EXTENDED RELEASE ORAL at 22:06

## 2023-02-22 RX ADMIN — REMDESIVIR 200 MG: 100 INJECTION, POWDER, LYOPHILIZED, FOR SOLUTION INTRAVENOUS at 13:45

## 2023-02-22 RX ADMIN — FUROSEMIDE 40 MG: 10 INJECTION, SOLUTION INTRAMUSCULAR; INTRAVENOUS at 00:39

## 2023-02-22 RX ADMIN — LISINOPRIL 10 MG: 10 TABLET ORAL at 22:06

## 2023-02-22 ASSESSMENT — ACTIVITIES OF DAILY LIVING (ADL)
ADLS_ACUITY_SCORE: 49
ADLS_ACUITY_SCORE: 35
ADLS_ACUITY_SCORE: 49
ADLS_ACUITY_SCORE: 49
ADLS_ACUITY_SCORE: 35

## 2023-02-22 NOTE — ED PROVIDER NOTES
History     Chief Complaint:  Fall       HPI   Ciro Wells is a 88 year old male with a history of PE and DVT, on Coumadin, presenting status post mechanical fall.  Patient reports around 4 PM he was walking to his room when he tripped over his bedroom slippers and fell onto his right side.  He denies any head trauma or loss of consciousness.  He is not able to get up from the floor however for a few hours and was resistant to call for help.  Patient finally allowed his wife to call for help.  EMS arrived and reportedly help patient up to his bed and patient felt comfortable deferring transfer to the ED.  A few hours later patient reports having persistent weakness and inability to get up from the bed.  He reports his core is not strong.  He denies any headache, fever, chills, chest pain, dyspnea, nausea, vomiting, abdominal pain, dysuria, back pain, neck pain or other symptoms.    Independent Historian:   Patient and EMS    Review of External Notes: 3/17/22 TCU note    ROS:  Review of Systems   Constitutional: Negative for fever.   Respiratory: Negative for shortness of breath.    Cardiovascular: Negative for chest pain.   Musculoskeletal: Negative for back pain.   Neurological: Positive for weakness. Negative for dizziness, light-headedness and numbness.   All other systems reviewed and are negative.      Allergies:  Other Environmental Allergy     Medications:    acetaminophen (TYLENOL) 325 MG tablet  cholecalciferol 25 MCG (1000 UT) TABS  furosemide (LASIX) 20 MG tablet  lisinopril (ZESTRIL) 5 MG tablet  metoprolol succinate ER (TOPROL-XL) 100 MG 24 hr tablet  Multiple Vitamins-Minerals (CENTRUM SILVER 50+MEN PO)  potassium chloride ER (K-TAB) 20 MEQ CR tablet  triamcinolone (KENALOG) 0.1 % external cream  Warfarin Therapy Reminder        Past Medical History:    PE  DVT    Past Surgical History:    No past surgical history on file.     Family History:    family history is not on file.    Social  History:  Denies ETOH  PCP: Maximo Granados     Physical Exam   Patient Vitals for the past 24 hrs:   BP Temp Temp src Pulse Resp SpO2   02/21/23 2005 (!) 190/66 99  F (37.2  C) Temporal 63 16 97 %        Physical Exam  General: Alert. Appears comfortable  Head:  The scalp, face, and head appear normal without trauma  Eyes:  Sclera white; Pupils are equal and round  ENT:    External ears normal.  No hemotympanum.      External nares normal.  No septal hematoma.    Neck:  No midline tenderness or pain with full ROM.  CV:  Rate as above with regular rhythm   No murmur   2/2 radial and dorsal pedal pulses  Resp:  Breath sounds clear and equal bilaterally    Non-labored, no retractions or accessory muscle use  GI:  Abdomen soft, non-tender, non-distended    No rebound tenderness or guarding  MSK:  No midline tenderness or bony step-off    No deformity; L. Knee abrasion, full active ROM    Moves all extremities equally and symmetrically; +1 LE edema bilaterally  Skin:  No rash or lesions noted.  Neuro:   Alert to person/place and time    Strength 5/5 x4.  Sensation intact x4.     GCS: 15  Psych:  Normal affect.        Emergency Department Course   ECG (21:18:31):  Rate 92. bpm. MN interval 186. QRS duration 92. QT/QTc 380/469. P-R-T axes 82 2 52. Sinus rhythm with premature atrial complexes with aberrant conduction. Interpreted at 2118 by Yvette Tate, DO      Imaging:  XR Pelvis 1/2 Views   Final Result   IMPRESSION: Numerous pelvic surgical clips. Partially imaged right hip arthroplasty. Left hip arthritic change. Pelvic ring appears intact. No displaced left hip fracture. Advanced degenerative change of the lower lumbar spine.      XR Chest 1 View   Final Result   IMPRESSION: Heart size at upper limits normal. Calcified aortic arch. No focal airspace consolidation. No visible pneumothorax. Old contour deformity of the right proximal humerus, partially imaged.      Head CT w/o contrast   Final Result    IMPRESSION:   1.  No CT evidence for acute intracranial process.   2.  Chronic changes similar to prior.         Report per radiology    Laboratory:  Labs Ordered and Resulted from Time of ED Arrival to Time of ED Departure   INR - Abnormal       Result Value    INR 1.72 (*)    TROPONIN T, HIGH SENSITIVITY - Abnormal    Troponin T, High Sensitivity 44 (*)    MAGNESIUM - Abnormal    Magnesium 1.6 (*)    COMPREHENSIVE METABOLIC PANEL - Abnormal    Sodium 138      Potassium 4.3      Chloride 100      Carbon Dioxide (CO2) 26      Anion Gap 12      Urea Nitrogen 24.2 (*)     Creatinine 1.22 (*)     Calcium 9.2      Glucose 115 (*)     Alkaline Phosphatase 91      AST 19      ALT 16      Protein Total 6.8      Albumin 4.1      Bilirubin Total 0.9      GFR Estimate 57 (*)    URINE MACROSCOPIC WITH REFLEX TO MICRO - Abnormal    Color Urine Light Yellow      Appearance Urine Clear      Glucose Urine Negative      Bilirubin Urine Negative      Ketones Urine Negative      Specific Gravity Urine 1.019      Blood Urine Small (*)     pH Urine 5.5      Protein Albumin Urine 70 (*)     Urobilinogen Urine Normal      Nitrite Urine Negative      Leukocyte Esterase Urine Negative      RBC Urine 1      WBC Urine <1      Squamous Epithelials Urine <1      Mucus Urine Present (*)     Hyaline Casts Urine 1     CBC WITH PLATELETS AND DIFFERENTIAL - Abnormal    WBC Count 9.4      RBC Count 4.41      Hemoglobin 13.3      Hematocrit 40.7      MCV 92      MCH 30.2      MCHC 32.7      RDW 13.5      Platelet Count 228      % Neutrophils 87      % Lymphocytes 6      % Monocytes 7      % Eosinophils 0      % Basophils 0      % Immature Granulocytes 0      NRBCs per 100 WBC 0      Absolute Neutrophils 8.1      Absolute Lymphocytes 0.5 (*)     Absolute Monocytes 0.6      Absolute Eosinophils 0.0      Absolute Basophils 0.0      Absolute Immature Granulocytes 0.0      Absolute NRBCs 0.0     TROPONIN T, HIGH SENSITIVITY - Abnormal    Troponin T,  High Sensitivity 42 (*)    CK TOTAL - Normal         NT PROBNP INPATIENT - Normal    N terminal Pro BNP Inpatient 1,791     INFLUENZA A/B & SARS-COV2 PCR MULTIPLEX        Emergency Department Course & Assessments:      Interventions:  Medications   furosemide (LASIX) injection 40 mg (has no administration in time range)   magnesium sulfate 2 g in 50 mL sterile water intermittent infusion (0 g Intravenous Stopped 2/22/23 0022)        Consultations/Discussion of Management or Tests:  11:20PM  Spoke to patient's wife over the phone who reports she is COVID + and unable to care for her spouse  12:37 AM I spoke to Dr. Escobar, hospitalist       Disposition:  The patient was admitted to the hospital under the care of Dr. Escobar.     Impression & Plan      Medical Decision Making:  Patient is an 88-year-old male presenting status post mechanical fall.  He is somewhat of a vague historian.  He is without evidence of significant trauma on exam and is neurologically intact.  EKG without STEMI.  High-sensitivity screening troponin mildly elevated though I suspect this is more type II.  He clinically appears mildly fluid overloaded and was given a dose of IV diuresis during his time in the ED.  He is not hypoxic.  Labs without profound anemia or significant electrolyte derangement other than mild hypomagnesemia, replacement given.  During patient's time in the ED, he was able to ambulate with his walker though on speaking to patient's wife who recently tested positive for COVID-19, she does not feel comfortable with patient returning home today.  I suspect the patient likely has COVID-19 as well the result pending at time of admission.    Diagnosis:    ICD-10-CM    1. Fall, initial encounter  W19.XXXA       2. Generalized muscle weakness  M62.81       3. Hypomagnesemia  E83.42       4. Acute on chronic congestive heart failure, unspecified heart failure type (H)  I50.9       5. Suspected 2019 novel coronavirus infection   Z20.822            Discharge Medications:  New Prescriptions    No medications on file        2/21/2023   Yvette Tate, Yvette Freire,   02/22/23 0040

## 2023-02-22 NOTE — H&P
[facility]  Hospitalist Admission Note  Name: Ciro Wells    MRN: [id number]  YOB: 1934    Age: 88 year old  Date of admission: 2/21/2023  Primary care provider: Maximo Granados    Chief Complaint: Weakness, fall    Assessment and Plan:   Fall  Generalized weakness: Reports tripping on his slippers today falling on his right side and was unable to get up for a couple of hours.  He then felt a little bit better, but then again was unable to get out of bed so EMS was called.  Denies any pain or injury after his fall.  Chest x-ray, noncontrast head CT, pelvis x-ray negative for any trauma.  He does report some weakness in both of his legs for the past few weeks.  Normally ambulates with a walker, but was not prior to his fall.  Suspect his acute weakness is secondary to his COVID-19 infection.  He also doubled his Lasix dose the past 2 days so he may be slightly hypovolemic.  CK not elevated.  Spouse is his caregiver and she says she cannot take care of him right now due to her COVID-19 infection.  -Consult PT/SW  -Fall precautions    COVID-19 infection: Spouse symptomatic with COVID19 for last 3 days.  The patient other than feeling generally weak today as above has no other infectious symptoms on review of systems.  Temperature 99.2  F and O2 sats 97% on room air.  -If any worsening COVID-19 symptoms, recommend discussing IV Remdesevir with the patient    Cardiomyopathy  HTN  History atrial flutter  Elevated troponin: Previous history of atrial flutter and cardiomyopathy with EF as low as 40% in 2019 improved to 60-65% as of May 2022.  Also found of mild pulm hypertension then.  He is chronically on Lasix 40 mg daily for leg swelling, lisinopril 10 mg at bedtime, metoprolol succinate 50 mg at bedtime, potassium supplement, and warfarin.  He had some increasing lower extremity edema so he doubled his Lasix dose the past 2 days which improved his edema.  He has trace-1+ bilateral lower  extremity pitting edema on exam with wrinkling of the skin suggesting recent fluid loss.  NT proBNP not elevated.  His EKG shows NSR with PACs and no ST elevation or depression.  Troponin T slightly elevated at 44 downtrending to 42 on repeat check.  Denies any chest pain or shortness of breath.  A coronary angiogram from 2/2022 showed minimal nonobstructive CAD.  -Received 40 mg IV Lasix while he was in the ER.  I suspect he may actually be slightly volume down as he doubled his Lasix dose last 2 days and creatinine went up to 1.2 along with rising BUN.  Placed hold order on his home Lasix until BMP checked in the morning  -Strict intake and output and daily weights  -He takes his metoprolol and lisinopril at midnight which she is due for now so I have ordered these  -If he develops any respiratory symptoms or worsening lower extreme edema could update his TTE, but I do not suspect ACS at this time  -Resume warfarin, pharmacy to dose, daily INR.  INR subtherapeutic at 1.7 now  -Resume his potassium 20 meq daily    History DVT and PE: Chronically on warfarin.  Subtherapeutic INR 1.7.  -Resume warfarin, pharmacy to dose, daily INR    CKD stage IIIa: Creatinine baseline 1-1.1.  Creatinine up slightly at 1.2 as his BUN.  Doubled his Lasix dose yesterday which may be contributing.  Also on lisinopril.  -Very hypertensive so continuing his lisinopril at bedtime  -As above received 40 IV Lasix in the ER, placed hold order on further Lasix until we get a BMP in the morning    Hypomagnesemia: Magnesium slightly low at 1.6.  Likely due to doubling his Lasix dose last 2 days.  -Potassium and magnesium replacement protocols    Chronic anemia: Hemoglobin currently 13.3 up from 9 range 1 year ago.  -CBC in the morning    DVT Prophylaxis: Warfarin  Code Status: Full Code -discussed with patient on admission  FEN: Regular diet  Discharge Dispo: Spouse is his caregiver and she says she cannot take care of him right now due to her  COVID-19 infection.  Consult PT and social work  Estimated Disch Date / # of Days until Disch: Admit to observation for weakness and COVID-19 infection.  May need placement for his home therapy due to weakness.      History of Present Illness:  Ciro Wells is a 88 year old male with PMH including DVT/PE on warfarin, paroxysmal atrial flutter, cardiomyopathy with most recent EF up to 60-65%, mild pulmonary HTN, HTN, CKD stage IIIa with baseline creatinine 1-1.1, anemia, and right ZACK who presents due to weakness and a fall.  He was walking in his house around 4 PM today when he tripped on his slippers and fell on his right side.  He denies any pain or injury from the fall.  He was weak and could not get up for a couple of hours.  He was unable to get up and he laid down.  He then was unable to get out of bed so EMS was called.  He was not using his walker like he normally does when he tripped and fell.  He does report some weakness in both of his legs over the past 2 to 3 weeks.  He says his spouse has COVID19 with symptoms for the last 3 days, but he has been feeling fine other than the weakness today.  He denies any fevers, chills, cough, shortness of breath, myalgias, headache, chest pain.  He says he takes his blood pressure medications at midnight which he is due for now.    History obtained from patient, medical record, and from Dr. Tate in the emergency department.  Blood pressure 190/66, heart rate 63, temperature 99.0  F, oxygen 97% on room air.  Creatinine 1.2 and BUN 24 otherwise BMP unremarkable.  Magnesium slightly low at 1.6.  LFTs within normal limits.  Blood glucose 115.  Troponin T 44 with repeat down to 42.  NT proBNP 1791.  CBC within normal limits.  COVID-19 PCR is positive.  Chest x-ray unremarkable, pelvis x-ray, noncontrast head CT unremarkable.  EKG shows NSR with PACs.  Spouse reports that she cannot take care of him at home due to her COVID-19 infection.  He is too weak to ambulate.   Admit to observation with PT and social work consultation.       Clinically Significant Risk Factors Present on Admission            # Hypomagnesemia: Lowest Mg = 1.6 mg/dL in last 2 days, will replace as needed    # Drug Induced Coagulation Defect: home medication list includes an anticoagulant medication    # Hypertension: home medication list includes antihypertensive(s)                        Past Medical History reviewed:  HTN  DVT  PE  CKD stage IIIa  Paroxysmal atrial flutter  Aspiration pneumonia  Cardiomyopathy  Anemia  Prostate cancer    Past Surgical History reviewed:  Right ZACK  Coronary angiogram    Social History reviewed:  Non-smoker  No alcohol  Lives at home with spouse    Family History reviewed:  Reviewed and noncontributory this admission    Allergies:  Allergies   Allergen Reactions     Other Environmental Allergy      Pollen, Dust     Medications:  Prior to Admission medications    Medication Sig Last Dose Taking? Auth Provider Long Term End Date   acetaminophen (TYLENOL) 325 MG tablet Take 650 mg by mouth every 4 hours as needed   Reported, Patient     cholecalciferol 25 MCG (1000 UT) TABS Take 1,000 Units by mouth daily   Reported, Patient     furosemide (LASIX) 20 MG tablet Take 40 mg by mouth daily   Reported, Patient  3/1/23   lisinopril (ZESTRIL) 5 MG tablet Take 2 tablets (10 mg) by mouth at night   Valeria Jorgensen APRN CNP Yes    metoprolol succinate ER (TOPROL-XL) 100 MG 24 hr tablet Take 50 mg by mouth at night   Reported, Patient  3/2/23   Multiple Vitamins-Minerals (CENTRUM SILVER 50+MEN PO) Take 1 tablet by mouth daily   Reported, Patient     potassium chloride ER (K-TAB) 20 MEQ CR tablet Take 1 tablet (20 mEq) by mouth daily   Valeria Jorgensen APRN CNP     triamcinolone (KENALOG) 0.1 % external cream Apply topically See Admin Instructions Apply to calves topically as needed for dermalitis apply to scaly pink areas on calves 1-2 x daily until smooth as needed   Reported,  Patient     Warfarin Therapy Reminder 1 each continuous prn (At discharge coumadin 5 mg Thursday and Saturday and 4 mg rest of days. INR 3/21)   Valeria Jorgensen APRN CNP       Review of Systems:  A Comprehensive greater than 10 system review of systems was carried out.  Pertinent positives and negatives are noted above.  Otherwise negative.     Physical Exam:  Blood pressure (!) 190/69, pulse 63, temperature 99  F (37.2  C), temperature source Temporal, resp. rate 16, SpO2 94 %.  Wt Readings from Last 1 Encounters:   03/16/22 103.5 kg (228 lb 3.2 oz)     Exam:  Constitutional: Awake, NAD  Eyes: sclera white  HEENT: atraumatic, MMM  Respiratory: no respiratory distress, lungs cta bilaterally, no crackles or wheeze  Cardiovascular: RRR.  No murmur appreciated  GI: non-tender, not distended, bowel sounds present  Skin: Abrasion to right knee, no rash  Musculoskeletal/extremities: atraumatic, no major deformities.  Trace-1+ lower extremity edema  Neurologic: A&O, speech clear, moves extremities equally, light touch sensation intact  Psychiatric: calm, cooperative, normal affect    Lab and imaging data personally reviewed:  Labs:  Recent Labs   Lab 02/21/23 2032   WBC 9.4   HGB 13.3   HCT 40.7   MCV 92        Recent Labs   Lab 02/21/23 2032      POTASSIUM 4.3   CHLORIDE 100   CO2 26   ANIONGAP 12   *   BUN 24.2*   CR 1.22*   GFRESTIMATED 57*   CASEY 9.2   MAG 1.6*   PROTTOTAL 6.8   ALBUMIN 4.1   BILITOTAL 0.9   ALKPHOS 91   AST 19   ALT 16     Recent Labs   Lab 02/21/23 2032   NTBNPI 1,791     Recent Labs   Lab 02/21/23 2032        Troponin T 44 with repeat 42  COVID-19 PCR positive  Influenza A/B and RSV PCR negative  INR 1.7  Urinalysis with no pyuria    EKG: NSR with PACs and no ST elevation or depression    Imaging:  Recent Results (from the past 24 hour(s))   Head CT w/o contrast    Narrative    EXAM: CT HEAD W/O CONTRAST  LOCATION: [facility]  DATE/TIME:  2/21/2023 10:36 PM    INDICATION: Head injury  COMPARISON: MRI 8/11/2021  TECHNIQUE: Routine CT Head without IV contrast. Multiplanar reformats. Dose reduction techniques were used.    FINDINGS:  INTRACRANIAL CONTENTS: No intracranial hemorrhage, extraaxial collection, or mass effect.  No CT evidence of acute infarct. Mild presumed chronic small vessel ischemic changes. 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 Moderate generalized volume loss. No hydrocephalus.     VISUALIZED ORBITS/SINUSES/MASTOIDS: No intraorbital abnormality. No paranasal sinus mucosal disease. No middle ear or mastoid effusion.    BONES/SOFT TISSUES: No acute abnormality. Left suboccipital craniotomy. Embolization coils left middle meningeal artery.      Impression    IMPRESSION:  1.  No CT evidence for acute intracranial process.  2.  Chronic changes similar to prior.   XR Chest 1 View    Narrative    EXAM: XR CHEST 1 VIEW  LOCATION: [facility]  DATE/TIME: 2/21/2023 10:43 PM    INDICATION: fall  COMPARISON: 02/20/2022      Impression    IMPRESSION: Heart size at upper limits normal. Calcified aortic arch. No focal airspace consolidation. No visible pneumothorax. Old contour deformity of the right proximal humerus, partially imaged.   XR Pelvis 1/2 Views    Narrative    EXAM: XR PELVIS 1/2 VIEWS  LOCATION: [facility]  DATE/TIME: 2/21/2023 11:19 PM    INDICATION: fall  COMPARISON: 02/20/2022      Impression    IMPRESSION: Numerous pelvic surgical clips. Partially imaged right hip arthroplasty. Left hip arthritic change. Pelvic ring appears intact. No displaced left hip fracture. Advanced degenerative change of the lower lumbar spine.       Biju Escobar MD  Hospitalist  [facility]

## 2023-02-22 NOTE — PROGRESS NOTES
Patient seen and examined.  Chart reviewed.    Please see admission history and physical by Dr. Escobar from earlier today for details.    Here with COVID-19 infection.  Not hypoxic.  Mild to moderate disease.  Discussed possibility of IV remdesivir with patient today.  He would like 3-day course of IV remdesivir.  This has been ordered.

## 2023-02-22 NOTE — ED NOTES
[facility]  ED Nurse Handoff Report    Ciro Wells is a 88 year old male   ED Chief complaint: Fall  . ED Diagnosis:   Final diagnoses:   Fall, initial encounter   Generalized muscle weakness   Hypomagnesemia   Acute on chronic congestive heart failure, unspecified heart failure type (H)   Suspected 2019 novel coronavirus infection     Allergies:   Allergies   Allergen Reactions    Other Environmental Allergy      Pollen, Dust       Code Status: Full Code  Activity level - Baseline/Home:  Independent. Activity Level - Current:   Assist X 1. Lift room needed: No. Bariatric: No   Needed: No   Isolation: Yes. Infection: Not Applicable  COVID r/o and special precautions.     Vital Signs:   Vitals:    02/22/23 0809 02/22/23 0939 02/22/23 1002 02/22/23 1032   BP: (!) 167/54 (!) 140/62 (!) 153/57 (!) 156/50   Pulse: (!) 49  62 53   Resp:       Temp:       TempSrc:       SpO2:  (!) 72% 97% (!) 89%   Weight: 100.7 kg (222 lb)      Height: 1.829 m (6')          Cardiac Rhythm: NA  Pain level: 0   Patient confused: No. Patient Falls Risk: Yes.   Elimination Status: Has voided   Patient Report - Initial Complaint: fell at home after tripping over a pair of slippers unable to get self up due to feeling weak. Did not want EMS call sat on floor for 2 hours then wife called someone to help get him in to bed. Unable to get out of bed wife then called 911. No injuries except for abrasion on left knee. No LOC Focused Assessment: Alert states he feels better now then when he came in. Did test positive for COVID. No cough noted. Does not feel the urge when he has to urinate. Was incontinent of large amount of urine. Denies any pain or trouble breathing.  Tests Performed: labs. Abnormal Results:   Abnormal Labs Resulted from Time of ED Arrival to Time of ED Departure   INR - Abnormal       Result Value    INR 1.72 (*)    TROPONIN T, HIGH SENSITIVITY - Abnormal    Troponin T, High Sensitivity 44 (*)     MAGNESIUM - Abnormal    Magnesium 1.6 (*)    COMPREHENSIVE METABOLIC PANEL - Abnormal    Sodium 138      Potassium 4.3      Chloride 100      Carbon Dioxide (CO2) 26      Anion Gap 12      Urea Nitrogen 24.2 (*)     Creatinine 1.22 (*)     Calcium 9.2      Glucose 115 (*)     Alkaline Phosphatase 91      AST 19      ALT 16      Protein Total 6.8      Albumin 4.1      Bilirubin Total 0.9      GFR Estimate 57 (*)    URINE MACROSCOPIC WITH REFLEX TO MICRO - Abnormal    Color Urine Light Yellow      Appearance Urine Clear      Glucose Urine Negative      Bilirubin Urine Negative      Ketones Urine Negative      Specific Gravity Urine 1.019      Blood Urine Small (*)     pH Urine 5.5      Protein Albumin Urine 70 (*)     Urobilinogen Urine Normal      Nitrite Urine Negative      Leukocyte Esterase Urine Negative      RBC Urine 1      WBC Urine <1      Squamous Epithelials Urine <1      Mucus Urine Present (*)     Hyaline Casts Urine 1     CBC WITH PLATELETS AND DIFFERENTIAL - Abnormal    WBC Count 9.4      RBC Count 4.41      Hemoglobin 13.3      Hematocrit 40.7      MCV 92      MCH 30.2      MCHC 32.7      RDW 13.5      Platelet Count 228      % Neutrophils 87      % Lymphocytes 6      % Monocytes 7      % Eosinophils 0      % Basophils 0      % Immature Granulocytes 0      NRBCs per 100 WBC 0      Absolute Neutrophils 8.1      Absolute Lymphocytes 0.5 (*)     Absolute Monocytes 0.6      Absolute Eosinophils 0.0      Absolute Basophils 0.0      Absolute Immature Granulocytes 0.0      Absolute NRBCs 0.0     TROPONIN T, HIGH SENSITIVITY - Abnormal    Troponin T, High Sensitivity 42 (*)    INFLUENZA A/B & SARS-COV2 PCR MULTIPLEX - Abnormal    Influenza A PCR Negative      Influenza B PCR Negative      RSV PCR Negative      SARS CoV2 PCR Positive (*)    INR - Abnormal    INR 1.70 (*)    BASIC METABOLIC PANEL - Abnormal    Sodium 140      Potassium 3.8      Chloride 99      Carbon Dioxide (CO2) 30 (*)     Anion Gap 11       Urea Nitrogen 26.3 (*)     Creatinine 1.27 (*)     Calcium 9.4      Glucose 107 (*)     GFR Estimate 54 (*)    CBC WITH PLATELETS - Abnormal    WBC Count 7.4      RBC Count 4.26 (*)     Hemoglobin 12.9 (*)     Hematocrit 39.2 (*)     MCV 92      MCH 30.3      MCHC 32.9      RDW 13.4      Platelet Count 213      .   Treatments provided: fluids and monitoring  Family Comments: No family here  OBS brochure/video discussed/provided to patient:  N/A  ED Medications:   Medications   lisinopril (ZESTRIL) tablet 10 mg (10 mg Oral $Given 2/22/23 0216)   metoprolol succinate ER (TOPROL XL) 24 hr tablet 50 mg (50 mg Oral $Given 2/22/23 0216)   potassium chloride ER (KLOR-CON M) CR tablet 20 mEq (20 mEq Oral $Given 2/22/23 0938)   Warfarin Dose Required Daily - Pharmacist Managed (has no administration in time range)   melatonin tablet 1 mg (has no administration in time range)   ondansetron (ZOFRAN ODT) ODT tab 4 mg (has no administration in time range)     Or   ondansetron (ZOFRAN) injection 4 mg (has no administration in time range)   acetaminophen (TYLENOL) tablet 650 mg (has no administration in time range)     Or   acetaminophen (TYLENOL) Suppository 650 mg (has no administration in time range)   senna-docusate (SENOKOT-S/PERICOLACE) 8.6-50 MG per tablet 1 tablet (has no administration in time range)     Or   senna-docusate (SENOKOT-S/PERICOLACE) 8.6-50 MG per tablet 2 tablet (has no administration in time range)   furosemide (LASIX) tablet 40 mg ( Oral Automatically Held 2/25/23 0800)   warfarin ANTICOAGULANT (COUMADIN) tablet 6 mg (has no administration in time range)   magnesium sulfate 2 g in 50 mL sterile water intermittent infusion (0 g Intravenous Stopped 2/22/23 0022)   furosemide (LASIX) injection 40 mg (40 mg Intravenous $Given 2/22/23 0039)     Drips infusing:  No  For the majority of the shift, the patient's behavior Green. Interventions performed were None.    Sepsis treatment initiated: No     Patient tested  for COVID 19 prior to admission: YES    ED Nurse Name/Phone Number: Nori Youssef RN,   11:02 AM

## 2023-02-22 NOTE — PHARMACY-ANTICOAGULATION SERVICE
Clinical Pharmacy - Warfarin Dosing Consult     Pharmacy has been consulted to manage this patient s warfarin therapy.  Indication: DVT/PE Prophylaxis  Therapy Goal: INR 2-3  Warfarin Prior to Admission: Yes  Warfarin PTA Regimen: Warfarin 6mg every Monday,Thursday, Sunday. Warfarin 4mg all other days.  Significant drug interactions: none  Recent documented change in oral intake/nutrition: No    INR   Date Value Ref Range Status   02/22/2023 1.70 (H) 0.85 - 1.15 Final   02/21/2023 1.72 (H) 0.85 - 1.15 Final       Recommend warfarin 6 mg today. Will give higher end of home dosing regimen as INR subtherapeutic and pt missed warfarin dose last PM.  Pharmacy will monitor Ciro Wells daily and order warfarin doses to achieve specified goal.      Please contact pharmacy as soon as possible if the warfarin needs to be held for a procedure or if the warfarin goals change.

## 2023-02-22 NOTE — ED TRIAGE NOTES
Pt presents via EMS from home where he lives with his wife. Per pt and EMS around 16:00 pt was walking in his room when he tripped over some slippers and fell on his right side. He denies any pain or known injury, hitting his head, or LOC. Pt was not able to get up for a couple hours from the floor as he was resistant to calling for assistance at first. Pt finally allowed spouse to call for help. He was assisted up to his bed. Later in the evening pt realized he had no strength to get up from bed and EMS was called again to bring him in. Pt denies use of alcohol. No family will be coming in due to weather conditions. Pt is A&O x 4. He is on warfarin. EMS placed 18ga IV in [facility].      Triage Assessment     Row Name 02/21/23 2002       Triage Assessment (Adult)    Airway WDL WDL       Respiratory WDL    Respiratory WDL WDL       Skin Circulation/Temperature WDL    Skin Circulation/Temperature WDL WDL       Cardiac WDL    Cardiac WDL WDL       Peripheral/Neurovascular WDL    Peripheral Neurovascular WDL WDL       Cognitive/Neuro/Behavioral WDL    Cognitive/Neuro/Behavioral WDL WDL

## 2023-02-22 NOTE — PHARMACY-ADMISSION MEDICATION HISTORY
Admission medication history interview status for this patient is complete. See [facility] admission navigator for allergy information, prior to admission medications and immunization status.     Medication history interview done, indicate source(s): Patient  Medication history resources (including written lists, pill bottles, clinic record):[employer]  [id number] - [place], [place] - [address]        Changes made to PTA medication list:  Added: none  Changed: Warfarin sig,   Reported as Not Taking: none  Removed: MTV    Actions taken by pharmacist (provider contacted, etc):Left provider sticky note     Additional medication history information:    Potassium: Pt does not take every day due to forgetting. Left on list as pt is supposed to be taking it everyday.     Warfarin: Unclear if pt took dose last night, not given here but pt states his PM pills were given to EMS but pt could not recall if he took them or not. So unlikely pt took his PM medications last night (including warfarin).     Medication reconciliation/reorder completed by provider prior to medication history?  Y   (Y/N)     Medication Affordability: N/A      Prior to Admission medications    Medication Sig Last Dose Taking? Auth Provider Long Term End Date   cholecalciferol 25 MCG (1000 UT) TABS Take 1,000 Units by mouth daily Past Month Yes Reported, Patient     furosemide (LASIX) 20 MG tablet Take 40 mg by mouth daily 2/20/2023 Yes Reported, Patient  3/1/23   lisinopril (ZESTRIL) 5 MG tablet Take 2 tablets (10 mg) by mouth daily 2/20/2023 at pm Yes Valeria Jorgensen APRN CNP Yes    metoprolol succinate ER (TOPROL XL) 50 MG 24 hr tablet Take 50 mg by mouth every evening 2/20/2023 at pm Yes Unknown, Entered By History No    potassium chloride ER (K-TAB) 20 MEQ CR tablet Take 1 tablet (20 mEq) by mouth daily Past Month Yes Valeria Jorgensen APRN CNP     warfarin ANTICOAGULANT (COUMADIN) 4 MG  tablet Warfarin 6mg every Monday,Thursday, Sunday. Warfarin 4mg all other days. 2/20/2023 at pm  Unknown, Entered By History No

## 2023-02-22 NOTE — PROGRESS NOTES
02/22/23 1220   Appointment Info   Signing Clinician's Name / Credentials (PT) Lucas Ramírez DPT   Living Environment   Living Environment Comments Pt lives in home with spouse; ~3 ORACIO with R railing. Use of walker for mobility.   Self-Care   Usual Activity Tolerance moderate   Current Activity Tolerance fair   Equipment Currently Used at Home walker, standard;grab bar, toilet;grab bar, tub/shower;shower chair   Fall history within last six months yes   Number of times patient has fallen within last six months 2   General Information   Onset of Illness/Injury or Date of Surgery 02/21/23   Referring Physician Biju Escobar MD   Patient/Family Therapy Goals Statement (PT) To improve mobility, srrength   Pertinent History of Current Problem (include personal factors and/or comorbidities that impact the POC) Pt is a 88 year old male with PMH including DVT/PE on warfarin, paroxysmal atrial flutter, cardiomyopathy with most recent EF up to 60-65%, mild pulmonary HTN, HTN, CKD stage IIIa with baseline creatinine 1-1.1, anemia, and right ZACK who presents due to weakness and a fall.   Existing Precautions/Restrictions fall   Cognition   Affect/Mental Status (Cognition) WFL   Orientation Status (Cognition) oriented x 4   Follows Commands (Cognition) WFL   Pain Assessment   Patient Currently in Pain No   Range of Motion (ROM)   ROM Comment B LE WFL   Strength (Manual Muscle Testing)   Strength (Manual Muscle Testing) Deficits observed during functional mobility   Strength Comments able to complete B SLR; pt with poor core strength/stability   Bed Mobility   Comment, (Bed Mobility) modA supine <> sit   Transfers   Comment, (Transfers) Mariusz sit <> Stand with FWW   Gait/Stairs (Locomotion)   Distance in Feet 16   Deviations/Abnormal Patterns (Gait) base of support, wide;gait speed decreased;weight shifting decreased   Comment, (Gait/Stairs) Mariusz with FWW   Balance   Balance Comments poor+ sitting EOB; fair  standing with FWW   Clinical Impression   Criteria for Skilled Therapeutic Intervention Yes, treatment indicated   PT Diagnosis (PT) impaired functional mobility   Influenced by the following impairments impaired balance, strength   Functional limitations due to impairments impaired bed mobility, transfers, ambulation, stairs   Clinical Presentation (PT Evaluation Complexity) Stable/Uncomplicated   Clinical Presentation Rationale Pt is medically stable   Clinical Decision Making (Complexity) low complexity   Planned Therapy Interventions (PT) balance training;bed mobility training;gait training;home exercise program;neuromuscular re-education;patient/family education;ROM (range of motion);stair training;strengthening;transfer training;progressive activity/exercise   Anticipated Equipment Needs at Discharge (PT) walker, rolling   Risk & Benefits of therapy have been explained evaluation/treatment results reviewed;care plan/treatment goals reviewed;risks/benefits reviewed;current/potential barriers reviewed;participants voiced agreement with care plan;participants included;patient   PT Total Evaluation Time   PT Eval, Low Complexity Minutes (00468) 18   Physical Therapy Goals   PT Frequency 5x/week   PT Predicted Duration/Target Date for Goal Attainment 02/26/23   PT Goals Bed Mobility;Transfers;Gait;Stairs   PT: Bed Mobility Modified independent;Supine to/from sit   PT: Transfers Supervision/stand-by assist;Sit to/from stand;Assistive device   PT: Gait Supervision/stand-by assist;Assistive device;100 feet   PT: Stairs Supervision/stand-by assist;3 stairs;Rail on right   PT Discharge Planning   PT Plan progress bed mobility; ind with transfers/ambulation   PT Discharge Recommendation (DC Rec) Transitional Care Facility   PT Rationale for DC Rec Pt is below baseline mobility, currently needing Ax1 for all mobility. Pt would benefit from continued PT in rehab facility before returning home to increased strength/ind with  activity.   PT Brief overview of current status Ax1 with FWW   Total Session Time   Total Session Time (sum of timed and untimed services) 18     [facility]  OUTPATIENT PHYSICAL THERAPY EVALUATION  PLAN OF TREATMENT FOR OUTPATIENT REHABILITATION  (COMPLETE FOR INITIAL CLAIMS ONLY)  Patient's Last Name, First Name, M.I.  YOB: 1934  PriscillaCiro  JOSEPH                        Provider's Name  [facility] Medical Record No.  [id number]                             Onset Date:  02/21/23   Start of Care Date:   02/22/23     Type:     _X_PT   ___OT   ___SLP Medical Diagnosis:      fall           PT Diagnosis:  impaired functional mobility Visits from SOC:  1     See note for plan of treatment, functional goals and certification details    I CERTIFY THE NEED FOR THESE SERVICES FURNISHED UNDER        THIS PLAN OF TREATMENT AND WHILE UNDER MY CARE     (Physician co-signature of this document indicates review and certification of the therapy plan).

## 2023-02-22 NOTE — PLAN OF CARE
ROOM # [id number]    Living Situation (if not independent, order SW consult): House with wife  Facility name:N/A  : Gillian (wife)    Activity level at baseline: Ind with walker or sometimes cane  Activity level on admit: A2 gaitbelt walker    Who will be transporting you at discharge: Gillian (wife)    Patient registered to observation; given Patient Bill of Rights; given the opportunity to ask questions about observation status and their plan of care.  Patient has been oriented to the observation room, bathroom and call light is in place.    Discussed discharge goals and expectations with patient/family.

## 2023-02-22 NOTE — ED NOTES
[facility]  ED Nurse Handoff Report    Ciro Wells is a 88 year old male   ED Chief complaint: Fall  . ED Diagnosis:   Final diagnoses:   Fall, initial encounter   Generalized muscle weakness   Hypomagnesemia   Acute on chronic congestive heart failure, unspecified heart failure type (H)   Suspected 2019 novel coronavirus infection     Allergies:   Allergies   Allergen Reactions     Other Environmental Allergy      Pollen, Dust       Code Status: Full Code  Activity level - Baseline/Home:  Independent. Activity Level - Current:   Stand by Assist. Lift room needed: No. Bariatric: No   Needed: No   Isolation: Yes. Infection: Not Applicable  COVID r/o and special precautions.     Vital Signs:   Vitals:    02/21/23 2005 02/21/23 2336 02/21/23 2351   BP: (!) 190/66 (!) 164/101 (!) 190/69   Pulse: 63     Resp: 16     Temp: 99  F (37.2  C)     TempSrc: Temporal     SpO2: 97% 94% 94%       Cardiac Rhythm:  ,      Pain level:    Patient confused: Intermittent. Patient Falls Risk: Yes.   Elimination Status: Has voided   Patient Report - Initial Complaint: fall. Focused Assessment: 88 year old male with a history of PE and DVT, on Coumadin, presenting status post mechanical fall.  Patient reports around 4 PM he was walking to his room when he tripped over his bedroom slippers and fell onto his right side.  He denies any head trauma or loss of consciousness.  He is not able to get up from the floor however for a few hours and was resistant to call for help.  Patient finally allowed his wife to call for help.  EMS arrived and reportedly help patient up to his bed and patient felt comfortable deferring transfer to the ED.  A few hours later patient reports having persistent weakness and inability to get up from the bed.  He reports his core is not strong.  He denies any headache, fever, chills, chest pain, dyspnea, nausea, vomiting, abdominal pain, dysuria, back pain, neck pain or other symptoms.    Tests Performed: imaging, labs. Abnormal Results:   Abnormal Labs Resulted from Time of ED Arrival to Time of ED Departure   INR - Abnormal       Result Value    INR 1.72 (*)    TROPONIN T, HIGH SENSITIVITY - Abnormal    Troponin T, High Sensitivity 44 (*)    MAGNESIUM - Abnormal    Magnesium 1.6 (*)    COMPREHENSIVE METABOLIC PANEL - Abnormal    Sodium 138      Potassium 4.3      Chloride 100      Carbon Dioxide (CO2) 26      Anion Gap 12      Urea Nitrogen 24.2 (*)     Creatinine 1.22 (*)     Calcium 9.2      Glucose 115 (*)     Alkaline Phosphatase 91      AST 19      ALT 16      Protein Total 6.8      Albumin 4.1      Bilirubin Total 0.9      GFR Estimate 57 (*)    URINE MACROSCOPIC WITH REFLEX TO MICRO - Abnormal    Color Urine Light Yellow      Appearance Urine Clear      Glucose Urine Negative      Bilirubin Urine Negative      Ketones Urine Negative      Specific Gravity Urine 1.019      Blood Urine Small (*)     pH Urine 5.5      Protein Albumin Urine 70 (*)     Urobilinogen Urine Normal      Nitrite Urine Negative      Leukocyte Esterase Urine Negative      RBC Urine 1      WBC Urine <1      Squamous Epithelials Urine <1      Mucus Urine Present (*)     Hyaline Casts Urine 1     CBC WITH PLATELETS AND DIFFERENTIAL - Abnormal    WBC Count 9.4      RBC Count 4.41      Hemoglobin 13.3      Hematocrit 40.7      MCV 92      MCH 30.2      MCHC 32.7      RDW 13.5      Platelet Count 228      % Neutrophils 87      % Lymphocytes 6      % Monocytes 7      % Eosinophils 0      % Basophils 0      % Immature Granulocytes 0      NRBCs per 100 WBC 0      Absolute Neutrophils 8.1      Absolute Lymphocytes 0.5 (*)     Absolute Monocytes 0.6      Absolute Eosinophils 0.0      Absolute Basophils 0.0      Absolute Immature Granulocytes 0.0      Absolute NRBCs 0.0     TROPONIN T, HIGH SENSITIVITY - Abnormal    Troponin T, High Sensitivity 42 (*)    INFLUENZA A/B & SARS-COV2 PCR MULTIPLEX - Abnormal    Influenza A PCR  Negative      Influenza B PCR Negative      RSV PCR Negative      SARS CoV2 PCR Positive (*)         Treatments provided: see MAR  Family Comments: involved and supportive  OBS brochure/video discussed/provided to patient:  Yes  ED Medications:   Medications   lisinopril (ZESTRIL) tablet 10 mg (has no administration in time range)   metoprolol succinate ER (TOPROL XL) 24 hr tablet 50 mg (has no administration in time range)   potassium chloride ER (K-TAB) TBCR 20 mEq (has no administration in time range)   Warfarin Dose Required Daily - Pharmacist Managed (has no administration in time range)   melatonin tablet 1 mg (has no administration in time range)   ondansetron (ZOFRAN ODT) ODT tab 4 mg (has no administration in time range)     Or   ondansetron (ZOFRAN) injection 4 mg (has no administration in time range)   acetaminophen (TYLENOL) tablet 650 mg (has no administration in time range)     Or   acetaminophen (TYLENOL) Suppository 650 mg (has no administration in time range)   senna-docusate (SENOKOT-S/PERICOLACE) 8.6-50 MG per tablet 1 tablet (has no administration in time range)     Or   senna-docusate (SENOKOT-S/PERICOLACE) 8.6-50 MG per tablet 2 tablet (has no administration in time range)   furosemide (LASIX) tablet 40 mg ( Oral Automatically Held 2/25/23 0800)   magnesium sulfate 2 g in 50 mL sterile water intermittent infusion (0 g Intravenous Stopped 2/22/23 0022)   furosemide (LASIX) injection 40 mg (40 mg Intravenous Given 2/22/23 0039)     Drips infusing:  No  For the majority of the shift, the patient's behavior Green. Interventions performed were NA.    Sepsis treatment initiated: No     Patient tested for COVID 19 prior to admission: YES - POSITIVE    ED Nurse Name/Phone Number: Peggy Ta RN,   1:57 AM    RECEIVING UNIT ED HANDOFF REVIEW    Above ED Nurse Handoff Report was reviewed: Yes  Reviewed by: Katlyn Parker RN on February 22, 2023 at 4:52 PM

## 2023-02-23 LAB
HOLD SPECIMEN: NORMAL
HOLD SPECIMEN: NORMAL
INR PPP: 1.59 (ref 0.85–1.15)
MAGNESIUM SERPL-MCNC: 1.8 MG/DL (ref 1.7–2.3)
POTASSIUM SERPL-SCNC: 3.7 MMOL/L (ref 3.4–5.3)

## 2023-02-23 PROCEDURE — G0378 HOSPITAL OBSERVATION PER HR: HCPCS

## 2023-02-23 PROCEDURE — 96366 THER/PROPH/DIAG IV INF ADDON: CPT

## 2023-02-23 PROCEDURE — 258N000003 HC RX IP 258 OP 636: Performed by: INTERNAL MEDICINE

## 2023-02-23 PROCEDURE — 84132 ASSAY OF SERUM POTASSIUM: CPT | Performed by: PHYSICIAN ASSISTANT

## 2023-02-23 PROCEDURE — 36415 COLL VENOUS BLD VENIPUNCTURE: CPT | Performed by: INTERNAL MEDICINE

## 2023-02-23 PROCEDURE — 250N000011 HC RX IP 250 OP 636: Performed by: INTERNAL MEDICINE

## 2023-02-23 PROCEDURE — 250N000013 HC RX MED GY IP 250 OP 250 PS 637: Performed by: INTERNAL MEDICINE

## 2023-02-23 PROCEDURE — 99232 SBSQ HOSP IP/OBS MODERATE 35: CPT | Performed by: PHYSICIAN ASSISTANT

## 2023-02-23 PROCEDURE — 83735 ASSAY OF MAGNESIUM: CPT | Performed by: PHYSICIAN ASSISTANT

## 2023-02-23 PROCEDURE — 85610 PROTHROMBIN TIME: CPT | Performed by: INTERNAL MEDICINE

## 2023-02-23 PROCEDURE — 250N000013 HC RX MED GY IP 250 OP 250 PS 637: Performed by: PHYSICIAN ASSISTANT

## 2023-02-23 PROCEDURE — 96376 TX/PRO/DX INJ SAME DRUG ADON: CPT

## 2023-02-23 RX ADMIN — POTASSIUM CHLORIDE 20 MEQ: 1500 TABLET, EXTENDED RELEASE ORAL at 09:01

## 2023-02-23 RX ADMIN — SODIUM CHLORIDE 50 ML: 9 INJECTION, SOLUTION INTRAVENOUS at 18:50

## 2023-02-23 RX ADMIN — REMDESIVIR 100 MG: 100 INJECTION, POWDER, LYOPHILIZED, FOR SOLUTION INTRAVENOUS at 18:08

## 2023-02-23 RX ADMIN — WARFARIN SODIUM 7.5 MG: 5 TABLET ORAL at 18:08

## 2023-02-23 RX ADMIN — LISINOPRIL 10 MG: 10 TABLET ORAL at 21:10

## 2023-02-23 RX ADMIN — METOPROLOL SUCCINATE 50 MG: 50 TABLET, EXTENDED RELEASE ORAL at 21:10

## 2023-02-23 ASSESSMENT — ACTIVITIES OF DAILY LIVING (ADL)
ADLS_ACUITY_SCORE: 49

## 2023-02-23 NOTE — UTILIZATION REVIEW
Concurrent stay review; Secondary Review Determination - [facility]        Under the authority of the Utilization Management Committee, the utilization review process indicated a secondary review on the above patient.  The review outcome is based on review of the medical records, discussions with staff, and applying clinical experience noted on the date of the review.        (x) Observation/outpatient Status Appropriate - Concurrent stay review       RATIONALE FOR DETERMINATION:     88 year old male with PMH including DVT/PE on warfarin, paroxysmal atrial flutter, cardiomyopathy with most recent EF up to 60-65%, mild pulmonary HTN, HTN, CKD stage IIIa with baseline creatinine 1-1.1, anemia, and right ZACK who presented to the ED on 2/21/23 due to weakness and a fall. Found to be COVID positive, has cough but not hypoxemic. Receiving remdesivir for prophylactic reasons. No significant fractures. No KEM. Awaiting TCU placement.    Patient delayed discharge is related to disposition, there is no medical necessity for inpatient admission at the time of this review. If there is a change in patient status, please resend for review.    The information on this document is developed by the utilization review team in order for the business office to ensure compliance.  This only denotes the appropriateness of proper admission status and does not reflect the quality of care rendered.       The definitions of Inpatient Status and Observation Status used in making the determination above are those provided in the CMS Coverage Manual, Chapter 1 and Chapter 6, section 70.4.       Sincerely,    Dada Dasilva, DO

## 2023-02-23 NOTE — PLAN OF CARE
PRIMARY DIAGNOSIS: GENERALIZED WEAKNESS,    OUTPATIENT/OBSERVATION GOALS TO BE MET BEFORE DISCHARGE  1. Orthostatic performed: N/A    2. Tolerating PO medications: Yes    3. Return to near baseline physical activity: No    4. Cleared for discharge by consultants (if involved): No    Discharge Planner Nurse   Safe discharge environment identified: Yes  Barriers to discharge: No       Entered by: Raza Hoff RN 02/23/2023 3:57 AM     Please review provider order for any additional goals.   Nurse to notify provider when observation goals have been met and patient is ready for discharge.

## 2023-02-23 NOTE — PLAN OF CARE
PRIMARY DIAGNOSIS: Fall/ COVID +/ GENERALIZED WEAKNESS    OUTPATIENT/OBSERVATION GOALS TO BE MET BEFORE DISCHARGE  1. Orthostatic performed: N/A    2. Tolerating PO medications: Yes    3. Return to near baseline physical activity: No    4. Cleared for discharge by consultants (if involved): No    Discharge Planner Nurse   Safe discharge environment identified: No  Barriers to discharge: Yes       Entered by: Zeenat Love RN 02/22/2023      Please review provider order for any additional goals.   Nurse to notify provider when observation goals have been met and patient is ready for discharge.    Alert- Oriented to self and place- some intermittent confusion and forgetfulness noted. Up A1 gaitbelt and walker in room. Denies pain. Infrequent cough noted, O2 sats stable on RA. COVID precautions maintained. Max T 100.8.  Plan for PT/SW consults.

## 2023-02-23 NOTE — PROGRESS NOTES
[facility]  Internal Medicine  Progress Note    Date of Service: 2/23/2023    Patient: Ciro Wells  MRN: [id number]  Admission Date: 2/21/2023      Assessment & Plan: Ciro Wells is a 88 year old male with PMH including DVT/PE on warfarin, paroxysmal atrial flutter, cardiomyopathy with most recent EF up to 60-65%, mild pulmonary HTN, HTN, CKD stage IIIa with baseline creatinine 1-1.1, anemia, and right ZACK who presented to the ED on 2/21/23 due to weakness and a fall.      Fall  Generalized weakness - suspect his acute weakness is secondary to his COVID-19 infection.  CXR, CTH, pelvis x-ray negative for any trauma.    - PT consult recommend TCU  - SW consulted for placement    COVID-19 infection - symptom onset reported starting two days pta with cough and generalized weakness.  Vaccinated and boosted. Spouse symptomatic with COVID-19.  Pt has remained on room air, tmax 100.8.  CXR negative  - continue Remdesevir (day 2)      Cardiomyopathy  HTN  Hx atrial flutter  Elevated troponin - due to demand ischemia. Previous history of atrial flutter and cardiomyopathy with EF as low as 40% in 2019 improved to 60-65% as of May 2022.  Also found of mild pulm hypertension then.  BNP wnl  His EKG shows NSR with PACs and no ST elevation or depression.  Troponin T slightly elevated at 44 downtrending to 42 on repeat check.  Coronary angiogram from 2/2022 showed minimal nonobstructive CAD.  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  INR 1.59  - pharmacy consulted for warfarin dosing    CKD - creatinine 1.2, baseline 1-1.1      CODE: full  DVT: warfarin  Diet/fluids: regular  Disposition:  Awaiting TCU      Sangeetha PATRICK-C  Hospitalist Physician Assistant  [facility]      Subjective & Interval Hx:    Patient reports ongoing mild cough.  Denies any chest pain or shortness of breath.  Tolerating po.  Agreeable to TCU.    Last 24 hr care team  notes reviewed.   ROS:  4 point ROS including Respiratory, CV, GI and , other than that noted in the HPI, is negative.    Physical Exam:    Blood pressure 130/80, pulse 51, temperature 98.7  F (37.1  C), temperature source Oral, resp. rate 16, height 1.829 m (6'), weight 100.1 kg (220 lb 9.6 oz), SpO2 94 %.  General: Alert, interactive, NAD  HEENT: AT/NC  Resp: clear to auscultation bilaterally, no crackles or wheezes  Cardiac: regular rate and rhythm, no murmur  Abdomen: Soft, nontender, nondistended. +BS.  No HSM or masses, no rebound or guarding.  Extremities: Tracte-1+ BLE edema  Skin: Warm and dry  Neuro: Alert & oriented x 3, moves all extremities equally    Labs & Images:  Reviewed in Epic   Medications:    Current Facility-Administered Medications   Medication     remdesivir 100 mg in sodium chloride 0.9 % 250 mL intermittent infusion    And     0.9% sodium chloride BOLUS     acetaminophen (TYLENOL) tablet 650 mg    Or     acetaminophen (TYLENOL) Suppository 650 mg     [Held by provider] furosemide (LASIX) tablet 40 mg     lisinopril (ZESTRIL) tablet 10 mg     melatonin tablet 1 mg     metoprolol succinate ER (TOPROL XL) 24 hr tablet 50 mg     ondansetron (ZOFRAN ODT) ODT tab 4 mg    Or     ondansetron (ZOFRAN) injection 4 mg     potassium chloride ER (KLOR-CON M) CR tablet 20 mEq     senna-docusate (SENOKOT-S/PERICOLACE) 8.6-50 MG per tablet 1 tablet    Or     senna-docusate (SENOKOT-S/PERICOLACE) 8.6-50 MG per tablet 2 tablet     warfarin ANTICOAGULANT (COUMADIN) tablet 7.5 mg     Warfarin Dose Required Daily - Pharmacist Managed

## 2023-02-23 NOTE — PLAN OF CARE
PRIMARY DIAGNOSIS: GENERALIZED WEAKNESS    OUTPATIENT/OBSERVATION GOALS TO BE MET BEFORE DISCHARGE  1. Orthostatic performed: N/A    2. Tolerating PO medications: Yes    3. Return to near baseline physical activity: No    4. Cleared for discharge by consultants (if involved): No    Discharge Planner Nurse   Safe discharge environment identified: Yes  Barriers to discharge: Yes       Entered by: Raza Hoff RN 02/23/2023 6:10 AM    BP (!) 167/58   Pulse 57   Temp 98.9  F (37.2  C) (Oral)   Resp 17   Ht 1.829 m (6')   Wt 100.6 kg (221 lb 12.8 oz)   SpO2 94%   BMI 30.08 kg/m    Pt is alert to self. Pt has intermittent confusion. VSS. Pt is incontinent of bowel and bladder. Pt is on COVID 19 precaution. pt is on RA. Pt denies SOB. pt is on Remdesivir. Pt has +2 BLE edema. Legs elevated with a pillow. pt slept most of the shift and call light within reach. Will continue to monitor and assess pt.   Please review provider order for any additional goals.   Nurse to notify provider when observation goals have been met and patient is ready for discharge.

## 2023-02-23 NOTE — PLAN OF CARE
PRIMARY DIAGNOSIS: GENERALIZED WEAKNESS, FALL, COVID    OUTPATIENT/OBSERVATION GOALS TO BE MET BEFORE DISCHARGE  1. Orthostatic performed: No    2. Tolerating PO medications: Yes    3. Return to near baseline physical activity: No    4. Cleared for discharge by consultants (if involved): No    A&Ox2-3, forgetful at times. Pleasant and cooperative with cares, calls appropriately when needing to go to bathroom. C/o infrequent cough, at times productive, o/w denies SOB or breathing difficulties. Up w/ Ax1 w/ gait belt and walker. PT rec TCU, SW has referrals out. Special precautions maintained.    Discharge Planner Nurse   Safe discharge environment identified: TBD  Barriers to discharge: Yes       Entered by: Katlyn Parker RN 02/23/2023      Please review provider order for any additional goals.   Nurse to notify provider when observation goals have been met and patient is ready for discharge.

## 2023-02-23 NOTE — CONSULTS
Care Management Initial Consult    General Information  Assessment completed with: Spouse or significant other, Gillian  Type of CM/SW Visit: Offer D/C Planning    Primary Care Provider verified and updated as needed: Yes   Readmission within the last 30 days:     Reason for Consult: discharge planning  Advance Care Planning: Advance Care Planning Reviewed: no concerns identified        Communication Assessment  Patient's communication style: spoken language (English or Bilingual)    Hearing Difficulty or Deaf: no      Cognitive  Cognitive/Neuro/Behavioral: .WDL except, orientation  Level of Consciousness: alert, confused  Arousal Level: opens eyes spontaneously  Orientation: disoriented to  Mood/Behavior: calm  Best Language: 0 - No aphasia  Speech: clear    Living Environment:   People in home: spouse     Current living Arrangements: house      Able to return to prior arrangements: yes     Family/Social Support:  Care provided by: self  Provides care for: no one  Marital Status:   Wife          Description of Support System: Supportive, Involved         Current Resources:   Equipment currently used at home: walker, standard, grab bar, toilet, grab bar, tub/shower, shower chair    Employment/Financial:  Employment Status: retired     Financial Concerns: insurance, none   Referral to Financial Worker: No     Lifestyle & Psychosocial Needs:  Social Determinants of Health     Tobacco Use: Not on file   Alcohol Use: Not on file   Financial Resource Strain: Not on file   Food Insecurity: Not on file   Transportation Needs: Not on file   Physical Activity: Not on file   Stress: Not on file   Social Connections: Not on file   Intimate Partner Violence: Not on file   Depression: Not on file   Housing Stability: Not on file     Functional Status:  Prior to admission patient needed assistance: Patient was admitted under observation status for fall, weakness.     SW completed assessment with wife via phone as pt is  "COVID+ (2/22) with confusion and did not answer call to pt's room.     Wife reports that pt has a walker at home for ambulation but doesn't always remember to use it, so he \"furniture surfs\". Pt is able to complete ADLs at baseline with supervision. Wife assists with IADLs-meals, cleaning, grocery shopping, transportation.     PT evaluated patient and are recommending TCU. SW discussed limited TCU options for COVID+ being [facility] in [place], [facility] in [place], and [facility] in [place]. Wife agreeable to referrals being sent to [facility] and [facility].     Reviewed MOON letter with spouse.     Mental Health Status:  Mental Health Status: No Current Concerns       Chemical Dependency Status:  Chemical Dependency Status: No Current Concerns           Values/Beliefs:  Spiritual, Cultural Beliefs, Zoroastrian Practices, Values that affect care: yes             Additional Information:  Plan: TCU, referrals sent. Transport TBD-anticipate stretcher transport d/t COVID precautions. PAS needed. SW will continue to follow.     MARIELLA Marie, [facility]   Inpatient Care Coordination  [facility]   [phone]        "

## 2023-02-23 NOTE — PLAN OF CARE
PRIMARY DIAGNOSIS: GENERALIZED WEAKNESS, FALL, COVID    OUTPATIENT/OBSERVATION GOALS TO BE MET BEFORE DISCHARGE  1. Orthostatic performed: No    2. Tolerating PO medications: Yes    3. Return to near baseline physical activity: No    4. Cleared for discharge by consultants (if involved): No    Discharge Planner Nurse   Safe discharge environment identified: TBD  Barriers to discharge: Yes       Entered by: Katlyn Parker RN 02/23/2023      Please review provider order for any additional goals.   Nurse to notify provider when observation goals have been met and patient is ready for discharge.

## 2023-02-24 ENCOUNTER — APPOINTMENT (OUTPATIENT)
Dept: PHYSICAL THERAPY | Facility: CLINIC | Age: 88
End: 2023-02-24
Payer: COMMERCIAL

## 2023-02-24 LAB
HOLD SPECIMEN: NORMAL
INR PPP: 1.54 (ref 0.85–1.15)
MAGNESIUM SERPL-MCNC: 1.7 MG/DL (ref 1.7–2.3)
POTASSIUM SERPL-SCNC: 3.7 MMOL/L (ref 3.4–5.3)

## 2023-02-24 PROCEDURE — G0378 HOSPITAL OBSERVATION PER HR: HCPCS

## 2023-02-24 PROCEDURE — 99231 SBSQ HOSP IP/OBS SF/LOW 25: CPT

## 2023-02-24 PROCEDURE — G0378 HOSPITAL OBSERVATION PER HR: HCPCS | Mod: CS

## 2023-02-24 PROCEDURE — 97530 THERAPEUTIC ACTIVITIES: CPT | Mod: GP

## 2023-02-24 PROCEDURE — 83735 ASSAY OF MAGNESIUM: CPT | Performed by: PHYSICIAN ASSISTANT

## 2023-02-24 PROCEDURE — 250N000013 HC RX MED GY IP 250 OP 250 PS 637

## 2023-02-24 PROCEDURE — 84132 ASSAY OF SERUM POTASSIUM: CPT | Performed by: PHYSICIAN ASSISTANT

## 2023-02-24 PROCEDURE — 250N000013 HC RX MED GY IP 250 OP 250 PS 637: Performed by: INTERNAL MEDICINE

## 2023-02-24 PROCEDURE — 36415 COLL VENOUS BLD VENIPUNCTURE: CPT | Performed by: PHYSICIAN ASSISTANT

## 2023-02-24 PROCEDURE — 96376 TX/PRO/DX INJ SAME DRUG ADON: CPT

## 2023-02-24 PROCEDURE — 250N000011 HC RX IP 250 OP 636: Performed by: INTERNAL MEDICINE

## 2023-02-24 PROCEDURE — 97116 GAIT TRAINING THERAPY: CPT | Mod: GP

## 2023-02-24 PROCEDURE — 258N000003 HC RX IP 258 OP 636: Performed by: INTERNAL MEDICINE

## 2023-02-24 PROCEDURE — 85610 PROTHROMBIN TIME: CPT | Performed by: INTERNAL MEDICINE

## 2023-02-24 PROCEDURE — 250N000013 HC RX MED GY IP 250 OP 250 PS 637: Performed by: EMERGENCY MEDICINE

## 2023-02-24 RX ORDER — LISINOPRIL 20 MG/1
20 TABLET ORAL AT BEDTIME
Status: DISCONTINUED | OUTPATIENT
Start: 2023-02-24 | End: 2023-02-25 | Stop reason: HOSPADM

## 2023-02-24 RX ADMIN — SODIUM CHLORIDE 50 ML: 9 INJECTION, SOLUTION INTRAVENOUS at 18:16

## 2023-02-24 RX ADMIN — FUROSEMIDE 40 MG: 40 TABLET ORAL at 09:07

## 2023-02-24 RX ADMIN — LISINOPRIL 20 MG: 20 TABLET ORAL at 21:09

## 2023-02-24 RX ADMIN — REMDESIVIR 100 MG: 100 INJECTION, POWDER, LYOPHILIZED, FOR SOLUTION INTRAVENOUS at 18:13

## 2023-02-24 RX ADMIN — POTASSIUM CHLORIDE 20 MEQ: 1500 TABLET, EXTENDED RELEASE ORAL at 09:07

## 2023-02-24 RX ADMIN — ACETAMINOPHEN 650 MG: 325 TABLET ORAL at 06:43

## 2023-02-24 RX ADMIN — WARFARIN SODIUM 7.5 MG: 5 TABLET ORAL at 18:13

## 2023-02-24 ASSESSMENT — ACTIVITIES OF DAILY LIVING (ADL)
ADLS_ACUITY_SCORE: 49
ADLS_ACUITY_SCORE: 46
ADLS_ACUITY_SCORE: 49
ADLS_ACUITY_SCORE: 49

## 2023-02-24 NOTE — PROGRESS NOTES
[facility]    Medicine Progress Note - Hospitalist Service    Date of Admission:  2/21/2023    Assessment & Plan   Ciro Wells is a 88 year old male with PMH including DVT/PE on warfarin, paroxysmal atrial flutter, cardiomyopathy with most recent EF up to 60-65%, mild pulmonary HTN, HTN, CKD stage IIIa with baseline creatinine 1-1.1, anemia, and right ZACK who presented to the ED on 2/21/23 due to weakness and a fall.       Fall  Generalized weakness - suspect his acute weakness is secondary to his COVID-19 infection.  CXR, CTH, pelvis x-ray negative for any trauma.    - PT consult recommend TCU  - SW consulted for placement     COVID-19 infection - symptom onset reported starting two days pta with cough and generalized weakness.  Vaccinated and boosted. Spouse symptomatic with COVID-19.  Pt has remained on room air, tmax 100.8.  CXR negative  - continue Remdesevir      Cardiomyopathy  HTN  Hx atrial flutter  Elevated troponin - due to demand ischemia. Previous history of atrial flutter and cardiomyopathy with EF as low as 40% in 2019 improved to 60-65% as of May 2022.  Also found of mild pulm hypertension then.  BNP wnl  His EKG shows NSR with PACs and no ST elevation or depression.  Troponin T slightly elevated at 44 downtrending to 42 on repeat check.  Coronary angiogram from 2/2022 showed minimal nonobstructive CAD.  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  INR 1.59  - pharmacy consulted for warfarin dosing     CKD - creatinine 1.2, baseline 1-1.1  - check labs tomorrow?      Diet: Regular Diet Adult    DVT Prophylaxis: Warfarin  Beckham Catheter: Not present  Lines: None     Cardiac Monitoring: None  Code Status: Full Code      Clinically Significant Risk Factors Present on Admission               # Drug Induced Coagulation Defect: home medication list includes an  anticoagulant medication    # Hypertension: home medication list includes antihypertensive(s)   # Acute Respiratory Failure: Documented O2 saturation < 91%.  Continue supplemental oxygen as needed     # Obesity: Estimated body mass index is 30.18 kg/m  as calculated from the following:    Height as of this encounter: 1.829 m (6').    Weight as of this encounter: 100.9 kg (222 lb 8 oz).           Disposition Plan pending TCU placement     Expected Discharge Date: 02/24/2023    Discharge Delays: Placement - TCU            The patient's care was discussed with the Patient.    LORE Ponce PA-C  Hospitalist Service  [facility]  Securely message with [employer] (more info)  Text page via [facility] Paging/Directory   ______________________________________________________________________    Interval History   No concerns, Feels good today. No fevers or chills. Denies chest pain, SOB, abdominal pain, N/V.     Physical Exam   Vital Signs: Temp: 98.1  F (36.7  C) Temp src: Oral BP: (!) 156/65 Pulse: 50   Resp: 18 SpO2: 94 % O2 Device: None (Room air)    Weight: 222 lbs 8 oz    GENERAL:  Alert, Comfortable, No acute distress. Laying in bed.   PSYCH: pleasant, oriented.  HEENT:  Normocephalic,  No scleral icterus or conjunctival injection, normal hearing,  HEART:  Normal S1, S2 with no murmur, RRR  LUNGS:  Normal Respiratory effort. Clear to auscultation anteriorly bilaterally with no wheezing, rales or ronchi.  ABDOMEN:  Soft, non-tender, non distended. No peritoneal signs.   SKIN:  Warm, dry to touch. No rash  NEUROLOGIC: Speech clear, alert & orientated x 4, no focal deficits.     Medical Decision Making       MANAGEMENT DISCUSSED with the following over the past 24 hours: patient, wife, nurse   NOTE(S)/MEDICAL RECORDS REVIEWED over the past 24 hours: labs, imaging, progress notes      Data     I have personally reviewed the following data over the past 24 hrs:    N/A  \   N/A   / N/A     N/A N/A N/A /  N/A    3.7 N/A N/A \       INR:  1.54 (H) PTT:  N/A   D-dimer:  N/A Fibrinogen:  N/A       Imaging results reviewed over the past 24 hrs:   No results found for this or any previous visit (from the past 24 hour(s)).

## 2023-02-24 NOTE — PLAN OF CARE
PRIMARY DIAGNOSIS: GENERALIZED WEAKNESS/ COVID-19/Fall     OUTPATIENT/OBSERVATION GOALS TO BE MET BEFORE DISCHARGE  1. Orthostatic performed: N/A    2. Tolerating PO medications: Yes    3. Return to near baseline physical activity: No    4. Cleared for discharge by consultants (if involved): No    Discharge Planner Nurse   Safe discharge environment identified: No  Barriers to discharge: Yes       Entered by: Afshin Chan RN 02/24/2023 00:51     Please review provider order for any additional goals.   Nurse to notify provider when observation goals have been met and patient is ready for discharge.

## 2023-02-24 NOTE — PLAN OF CARE
PRIMARY DIAGNOSIS: GENERALIZED WEAKNESS/ COVID-19/Fall     OUTPATIENT/OBSERVATION GOALS TO BE MET BEFORE DISCHARGE  1. Orthostatic performed: N/A    2. Tolerating PO medications: Yes    3. Return to near baseline physical activity: No    4. Cleared for discharge by consultants (if involved): No    Discharge Planner Nurse   Safe discharge environment identified: No  Barriers to discharge: Yes       Entered by: Afshin Chan RN 02/23/2023 20:41     Please review provider order for any additional goals.   Nurse to notify provider when observation goals have been met and patient is ready for discharge.

## 2023-02-24 NOTE — PROGRESS NOTES
Pt A/Ox4, VSS, SBA with gb/walker, K+/Mg+ protocols. Reg diet. Pt L/S diminished, has non productive cough, Pt incontinent of bowel/bladder. Denies pain or SOB. Special precautions maintained for COVID positive.  Pt resting in room, will continue to monitor and provide cares.

## 2023-02-24 NOTE — PROGRESS NOTES
Care Management Follow Up    Expected Discharge Date: 02/25/2023     Concerns to be Addressed: Discharge planning      Patient plan of care discussed at interdisciplinary rounds: Yes    Anticipated Discharge Disposition: Transitional Care     Anticipated Discharge Services:  PT/OT    Patient/family educated on Medicare website which has current facility and service quality ratings:  Yes  Education Provided on the Discharge Plan:  Yes  Patient/Family in Agreement with the Plan:  Yes    Referrals Placed by CM/SW:  Skilled Nursing Facility  Private pay costs discussed: Not applicable    Additional Information:  SW continuing to follow for discharge planning. [facility] in [place] has declined d/t being out of insurance network. Referral pending at [facility]. Post acute care team left  to follow up on pending referral. Additional COVID TCU referral sent to [facility] for consideration. SW will continue to follow.     MARIELLA Marie, [facility]   Inpatient Care Coordination  [facility]   [phone]

## 2023-02-24 NOTE — PLAN OF CARE
PRIMARY DIAGNOSIS: GENERALIZED WEAKNESS, Fall, COVID.    OUTPATIENT/OBSERVATION GOALS TO BE MET BEFORE DISCHARGE  1. Orthostatic performed: No    2. Tolerating PO medications: Yes    3. Return to near baseline physical activity: No    4. Cleared for discharge by consultants (if involved): No    Discharge Planner Nurse   Safe discharge environment identified: No  Barriers to discharge: Yes       Entered by: Chantelle Diaz RN 02/24/2023 4:08 PM   Pt A/O x4. VSS but hypertensive. Lung sounds diminished. Infrequent non productive cough. On room air. Open wounds on knees. Cleansed and mepilex applied. Up SBA with walker and gait belt.  Plan is awaiting TCU placement.  Will continue to monitor.   BP (!) 151/53 (BP Location: Left arm)   Pulse (!) 46   Temp 97.8  F (36.6  C) (Oral)   Resp 18   Ht 1.829 m (6')   Wt 100.9 kg (222 lb 8 oz)   SpO2 96%   BMI 30.18 kg/m

## 2023-02-24 NOTE — PLAN OF CARE
PRIMARY DIAGNOSIS: GENERALIZED WEAKNESS/ COVID-19/Fall     OUTPATIENT/OBSERVATION GOALS TO BE MET BEFORE DISCHARGE  1. Orthostatic performed: N/A    2. Tolerating PO medications: Yes    3. Return to near baseline physical activity: No    4. Cleared for discharge by consultants (if involved): No    Discharge Planner Nurse   Safe discharge environment identified: No  Barriers to discharge: Yes   A & O X 3 with forgetfulness. Lung sound diminished to lower lobe. Denies shortness of breath. Infrequent nonproductive cough noted. Incontinent of B & B. On Potassium and Magnesium RN managed protocol. K 3.7, Mag 1.8, electrolyte lab draw this morning. Patient on special precaution for COVID-19. Isolation precaution maintained.  Patient on Remdesivir once daily. Afebrile. PT recommends TCU. SW following for discharge planning. Referrals sent .  BP (!) 163/64 (BP Location: Left arm)   Pulse (!) 47   Temp 99.1  F (37.3  C) (Oral)   Resp 18   Ht 1.829 m (6')   Wt 100.1 kg (220 lb 9.6 oz)   SpO2 96%   BMI 29.92 kg/m            Entered by: Afshin Chan RN 02/24/2023 06:01     Please review provider order for any additional goals.   Nurse to notify provider when observation goals have been met and patient is ready for discharge.

## 2023-02-25 ENCOUNTER — APPOINTMENT (OUTPATIENT)
Dept: PHYSICAL THERAPY | Facility: CLINIC | Age: 88
End: 2023-02-25
Payer: COMMERCIAL

## 2023-02-25 VITALS
RESPIRATION RATE: 16 BRPM | OXYGEN SATURATION: 97 % | HEIGHT: 72 IN | BODY MASS INDEX: 29.45 KG/M2 | HEART RATE: 70 BPM | SYSTOLIC BLOOD PRESSURE: 159 MMHG | WEIGHT: 217.4 LBS | TEMPERATURE: 97.8 F | DIASTOLIC BLOOD PRESSURE: 56 MMHG

## 2023-02-25 LAB
ANION GAP SERPL CALCULATED.3IONS-SCNC: 9 MMOL/L (ref 7–15)
BUN SERPL-MCNC: 30.7 MG/DL (ref 8–23)
CALCIUM SERPL-MCNC: 8.4 MG/DL (ref 8.8–10.2)
CHLORIDE SERPL-SCNC: 104 MMOL/L (ref 98–107)
CREAT SERPL-MCNC: 0.96 MG/DL (ref 0.67–1.17)
DEPRECATED HCO3 PLAS-SCNC: 26 MMOL/L (ref 22–29)
GFR SERPL CREATININE-BSD FRML MDRD: 76 ML/MIN/1.73M2
GLUCOSE SERPL-MCNC: 93 MG/DL (ref 70–99)
INR PPP: 1.67 (ref 0.85–1.15)
MAGNESIUM SERPL-MCNC: 1.7 MG/DL (ref 1.7–2.3)
POTASSIUM SERPL-SCNC: 4 MMOL/L (ref 3.4–5.3)
SODIUM SERPL-SCNC: 139 MMOL/L (ref 136–145)

## 2023-02-25 PROCEDURE — 80048 BASIC METABOLIC PNL TOTAL CA: CPT

## 2023-02-25 PROCEDURE — 250N000013 HC RX MED GY IP 250 OP 250 PS 637: Performed by: INTERNAL MEDICINE

## 2023-02-25 PROCEDURE — 99238 HOSP IP/OBS DSCHRG MGMT 30/<: CPT

## 2023-02-25 PROCEDURE — 97116 GAIT TRAINING THERAPY: CPT | Mod: GP | Performed by: PHYSICAL THERAPIST

## 2023-02-25 PROCEDURE — G0378 HOSPITAL OBSERVATION PER HR: HCPCS | Mod: CS

## 2023-02-25 PROCEDURE — 85610 PROTHROMBIN TIME: CPT | Performed by: INTERNAL MEDICINE

## 2023-02-25 PROCEDURE — 250N000013 HC RX MED GY IP 250 OP 250 PS 637: Performed by: EMERGENCY MEDICINE

## 2023-02-25 PROCEDURE — 36415 COLL VENOUS BLD VENIPUNCTURE: CPT | Performed by: INTERNAL MEDICINE

## 2023-02-25 PROCEDURE — 83735 ASSAY OF MAGNESIUM: CPT

## 2023-02-25 RX ORDER — LISINOPRIL 20 MG/1
20 TABLET ORAL AT BEDTIME
Qty: 30 TABLET | Refills: 0 | Status: SHIPPED | OUTPATIENT
Start: 2023-02-25

## 2023-02-25 RX ADMIN — FUROSEMIDE 40 MG: 40 TABLET ORAL at 08:32

## 2023-02-25 RX ADMIN — POTASSIUM CHLORIDE 20 MEQ: 1500 TABLET, EXTENDED RELEASE ORAL at 08:32

## 2023-02-25 RX ADMIN — WARFARIN SODIUM 7.5 MG: 5 TABLET ORAL at 18:02

## 2023-02-25 ASSESSMENT — ACTIVITIES OF DAILY LIVING (ADL)
ADLS_ACUITY_SCORE: 44
ADLS_ACUITY_SCORE: 46

## 2023-02-25 NOTE — PROGRESS NOTES
Care Management Discharge Note    Discharge Date: 02/25/2023       Discharge Disposition: Home, Home Care    Discharge Services: None    Discharge DME: None    Discharge Transportation: agency    Private pay costs discussed: transportation costs    Patient/family educated on Medicare website which has current facility and service quality ratings: no    Persons Notified of Discharge Plans: Pt  Patient/Family in Agreement with the Plan: yes    Additional Information:  CM following for discharge planning, pt has been cleared to discharge to home with home care RN/PT. Pt is agreement with this plan and has been accepted to [facility]     Wife unable to provide transportation home today, bedside RN and provider feel stretcher transport would be safest as pt is not able get WC transport due to covid +. Reviewed out of pocket cost for [facility] stretcher transport, $1117.00 for base rate and $26.06 per mile to the destination. Pt expressed understanding and are agreeable to this.      Patient requires stretcher transportation due to covid +    Stretcher transportation has been arranged for today at 1830. Patient and bedside nurse notified of transportation time.    Ambulance PCS form completed and placed in patient chart. Ambulance PCS form should be given to transportation team.    Orders completed and sent to [facility].      Katlyn Sesay RN BSN   Inpatient Care Coordination  [facility]   Phone [phone]

## 2023-02-25 NOTE — PHARMACY-ANTICOAGULATION SERVICE
Clinical Pharmacy- Warfarin Discharge Note  This patient is currently on warfarin for the treatment of DVT/PE prophylaxis.  INR Goal= 2-3  Warfarin PTA Regimen: Warfarin 6mg every Monday,Thursday, Sunday. Warfarin 4mg all other days.      Anticoagulation Dose History     Recent Dosing and Labs Latest Ref Rng & Units 2/21/2023 2/22/2023 2/23/2023 2/24/2023 2/25/2023    NICOLAS IMS TEMPLATE - - - 7.5 mg 7.5 mg -    Warfarin 3 mg - - 6 mg - - -    INR 0.85 - 1.15 1.72(H) 1.70(H) 1.59(H) 1.54(H) 1.67(H)        Patient to be discharged home with PTA warfarin regimen and will follow up with PCP within 7 days. Agree with discharge planning.

## 2023-02-25 NOTE — PLAN OF CARE
PRIMARY DIAGNOSIS: GENERALIZED WEAKNESS    OUTPATIENT/OBSERVATION GOALS TO BE MET BEFORE DISCHARGE  1. Orthostatic performed: N/A    2. Tolerating PO medications: Yes    3. Return to near baseline physical activity: No    4. Cleared for discharge by consultants (if involved): Yes    Vitals are Temp: 98.7  F (37.1  C) Temp src: Oral BP: (!) 165/65 Pulse: (!) 45   Resp: 18 SpO2: 96 %.    Patient is Alert and Oriented x4. Denies any pain with interview. Patient is Saline locked. Regular diet. He is 1 Assist with Gait Belt and Walker. Will continue to monitor.     Discharge Planner Nurse   Safe discharge environment identified: Yes  Barriers to discharge: Yes       Entered by: Elsy Stoddard RN 02/25/2023 2:40 AM    Please review provider order for any additional goals.   Nurse to notify provider when observation goals have been met and patient is ready for discharge.

## 2023-02-25 NOTE — PLAN OF CARE
PRIMARY DIAGNOSIS: GENERALIZED WEAKNESS, Fall, COVID.    OUTPATIENT/OBSERVATION GOALS TO BE MET BEFORE DISCHARGE  1. Orthostatic performed: No    2. Tolerating PO medications: Yes    3. Return to near baseline physical activity: No    4. Cleared for discharge by consultants (if involved): No    Discharge Planner Nurse   Safe discharge environment identified: No  Barriers to discharge: Yes       Entered by: Chantelle Diaz RN 02/24/2023 7:07 PM   Pt A/O x4. VSS but hypertensive and gerald. Lung sounds diminished. Infrequent non productive cough. On room air. Open wounds on knees. Cleansed and mepilex applied. Up SBA with walker and gait belt.  Remdesivir infused. Plan is awaiting TCU placement.  Will continue to monitor.   BP (!) 151/53 (BP Location: Left arm)   Pulse (!) 46   Temp 97.8  F (36.6  C) (Oral)   Resp 18   Ht 1.829 m (6')   Wt 100.9 kg (222 lb 8 oz)   SpO2 96%   BMI 30.18 kg/m

## 2023-02-25 NOTE — PLAN OF CARE
PRIMARY DIAGNOSIS: GENERALIZED WEAKNESS, Fall, COVID.    OUTPATIENT/OBSERVATION GOALS TO BE MET BEFORE DISCHARGE  1. Orthostatic performed: No    2. Tolerating PO medications: Yes    3. Return to near baseline physical activity: No    4. Cleared for discharge by consultants (if involved): No    Discharge Planner Nurse   Safe discharge environment identified: No  Barriers to discharge: Yes       Entered by: Chantelle Diaz RN 02/24/2023 7:09 PM   Pt A/O x4. VSS but hypertensive. Lung sounds diminished. Infrequent non productive cough. On room air. Open wounds on knees. Cleansed and mepilex applied. Up SBA with walker and gait belt.  Plan is awaiting TCU placement.  Will continue to monitor.   BP (!) 151/53 (BP Location: Left arm)   Pulse (!) 46   Temp 97.8  F (36.6  C) (Oral)   Resp 18   Ht 1.829 m (6')   Wt 100.9 kg (222 lb 8 oz)   SpO2 96%   BMI 30.18 kg/m

## 2023-02-25 NOTE — PLAN OF CARE
PRIMARY DIAGNOSIS: GENERALIZED WEAKNESS    OUTPATIENT/OBSERVATION GOALS TO BE MET BEFORE DISCHARGE  1. Orthostatic performed: N/A    2. Tolerating PO medications: Yes    3. Return to near baseline physical activity: No    4. Cleared for discharge by consultants (if involved): No    Discharge Planner Nurse   Safe discharge environment identified: Yes  Barriers to discharge: Yes       Entered by: Raza Hoff RN 02/24/2023 10:10 PM    BP (!) 155/57 (BP Location: Left arm)   Pulse (!) 46   Temp 98.7  F (37.1  C) (Oral)   Resp 18   Ht 1.829 m (6')   Wt 100.9 kg (222 lb 8 oz)   SpO2 96%   BMI 30.18 kg/m    Pt is alert and oriented. Pt has Infrequent non productive cough. VSS. Pt is incontinent of bowel and bladder. Pt is on COVID 19 precaution. pt is on RA. Pt denies SOB. pt is on Remdesivir. Pt has +2 BLE edema. Legs elevated with a pillow. pt slept most of the shift and call light within reach. Will continue to monitor and assess pt.   Please review provider order for any additional goals.   Nurse to notify provider when observation goals have been met and patient is ready for discharge.

## 2023-02-25 NOTE — PLAN OF CARE
PRIMARY DIAGNOSIS: GENERALIZED WEAKNESS    OUTPATIENT/OBSERVATION GOALS TO BE MET BEFORE DISCHARGE  1. Orthostatic performed: N/A    2. Tolerating PO medications: Yes    3. Return to near baseline physical activity: No    4. Cleared for discharge by consultants (if involved): Yes    Vitals are Temp: 98.6  F (37  C) Temp src: Oral BP: (!) 150/62 Pulse: 51   Resp: 14 SpO2: 96 %.    Patient is Alert and Oriented x4. Denies any pain with interview. Patient is Saline locked. Regular diet. He is 1 Assist with Gait Belt and Walker. PT recommended TCU. Social work is following for discharge planning. Possible PT re-assessment to discharge home with services.     Discharge Planner Nurse   Safe discharge environment identified: Yes  Barriers to discharge: Yes       Entered by: Elsy Stoddard RN 02/25/2023 10:58 AM    Please review provider order for any additional goals.   Nurse to notify provider when observation goals have been met and patient is ready for discharge.

## 2023-02-25 NOTE — PLAN OF CARE
PRIMARY DIAGNOSIS: GENERALIZED WEAKNESS    OUTPATIENT/OBSERVATION GOALS TO BE MET BEFORE DISCHARGE  1. Orthostatic performed: N/A    2. Tolerating PO medications: Yes    3. Return to near baseline physical activity: No    4. Cleared for discharge by consultants (if involved): Yes    Vitals are Temp: 98.9  F (37.2  C) Temp src: Oral BP: 134/53 Pulse: 51   Resp: 18 SpO2: 93 %.    Patient is Alert and Oriented x4. Denies any pain with interview. Patient is Saline locked. Regular diet. He is 1 Assist with Gait Belt and Walker. PT recommended TCU. Social work is following for discharge planning.     Discharge Planner Nurse   Safe discharge environment identified: Yes  Barriers to discharge: Yes       Entered by: Elsy Stoddard RN 02/25/2023 5:21 AM    Please review provider order for any additional goals.   Nurse to notify provider when observation goals have been met and patient is ready for discharge.

## 2023-02-25 NOTE — DISCHARGE SUMMARY
[facility]  Hospitalist Discharge Summary      Date of Admission:  2/21/2023  Date of Discharge:  2/25/2023  8:32 PM  Discharging Provider: LORE Ponce PA-C  Discharge Service: Hospitalist Service    Discharge Diagnoses    Fall  Generalized weakness  COVID-19 infection asymptomatic     Follow-ups Needed After Discharge      Follow up with primary care provider, Maximo Granados, within 7 days for   hospital follow- up.  The following labs/tests are recommended: Blood   pressure management, BMP recheck after increasing lisinopril.       Discharge Disposition   Discharged to home  Condition at discharge: Stable    Hospital Course   Ciro Wells is a 88 year old male with PMH including DVT/PE on warfarin, paroxysmal atrial flutter, cardiomyopathy with most recent EF up to 60-65%, mild pulmonary HTN, HTN, CKD stage IIIa with baseline creatinine 1-1.1, anemia, and right ZACK who presented to the ED on 2/21/23 due to weakness and a fall.  Incidentally found to be COVID-positive as well.     Fall  Generalized weakness - suspect his acute weakness is secondary to his COVID-19 infection.  CXR, CTH, pelvis x-ray negative for any trauma.    - PT consult initially recommended TCU however after several days PT reassessed and stated the patient would be okay to discharge with home PT and home care  - SW consulted for placement     COVID-19 infection - symptom onset reported starting two days pta with cough and generalized weakness.  Vaccinated and boosted. Spouse symptomatic with COVID-19.  Pt has remained on room air, tmax 100.8.  CXR negative  -Discontinued remdesevir at discharge  -Remained asymptomatic, no hypoxia, shortness of breath or cough     Cardiomyopathy  HTN  Hx atrial flutter  Elevated troponin - due to demand ischemia. Previous history of atrial flutter and cardiomyopathy with EF as low as 40% in 2019 improved to 60-65% as of May 2022.  Also found of mild pulm hypertension then.  BNP  wnl  His EKG shows NSR with PACs and no ST elevation or depression.  Troponin T slightly elevated at 44 downtrending to 42 on repeat check.  Coronary angiogram from 2/2022 showed minimal nonobstructive CAD.  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  INR 1.59  - pharmacy consulted for warfarin dosing  -Resume scheduled outpatient INR checks     CKD - creatinine 1.2, baseline 1-1.1    At time of discharge patient was alert and orientated and ambulating with use of a walker.  Patient stated he had a cane at home he could use however I discharged the patient home with a front wheeled walker just for better support at home.  Patient was discharged with home PT and home care and social work assisted with arranging transportation.  Patient was eating well and felt he would do well at home.  He continued to remain asymptomatic from COVID.  Educated patient on return precautions of new fevers, increasing shortness of breath, new or worsening cough to seek further medical attention.  Patient should follow-up with his PCP in 1 to 2 weeks for a BMP recheck due to increasing lisinopril due to consistently elevated blood pressures.     Consultations This Hospital Stay   PHARMACY TO DOSE WARFARIN  PHYSICAL THERAPY ADULT IP CONSULT  CARE MANAGEMENT / SOCIAL WORK IP CONSULT    Code Status   Full Code    Time Spent on this Encounter   ILORE PA-C, personally saw the patient today and spent less than or equal to 30 minutes discharging this patient.       LORE Ponce PA-C  [facility] OBSERVATION DEPT  [address] [place] [address]  Phone: [phone]  ______________________________________________________________________    Physical Exam   Vital Signs: Temp: 98.6  F (37  C) Temp src: Oral BP: (!) 150/62 Pulse: 51    Resp: 14 SpO2: 96 % O2 Device: None (Room air)    Weight: 217 lbs 6.4 oz    GENERAL:  Alert, Comfortable, No acute distress. Sitting up in chair.   PSYCH: pleasant, oriented.  HEENT:  Normocephalic, No scleral icterus or conjunctival injection, normal hearing  NECK:  Supple  HEART:  Normal S1, S2 with no murmur, RRR  LUNGS:  Normal Respiratory effort. Clear to auscultation bilaterally with no wheezing, rales or ronchi.   SKIN:  Warm, dry to touch. Small erythematous rash on the back.  NEUROLOGIC: Speech clear, alert & orientated x 4, no focal deficits.     Primary Care Physician   Maximo Granados    Discharge Orders      Home Care Referral      Reason for your hospital stay    You were admitted to the hospital after a fall and weakness due to COVID 19.  You were admitted to the observation unit for further monitoring.  You had an x-ray of your chest that was negative for any signs of infection.  X-ray of your hips did not show any fractures. Physical therapy initially recommended rehab however after reassessment a few days later your strength has improved and recommended home care and home PT. your blood work was not concerning.  You had slightly elevated blood pressures during this admission.  Your lisinopril was increased slightly.  I recommend that you check your blood pressure once a day at home and keep a log.  Please bring this blood pressure log to your primary care hospital follow-up visit in the next week or 2.  This log will allow your provider to adjust your medications if needed.  You also need to recheck your kidney function at this hospital follow-up visit.  If you develop any new fevers, increasing shortness of breath, worsening cough or overall feel unwell I would recommend you go to urgent care or the emergency department.     Follow-up and recommended labs and tests     Follow up with primary care provider, Maximo Granados, within 7 days for hospital follow- up.  The following labs/tests are  recommended: Blood pressure management, BMP recheck after increasing lisinopril.     Activity    Your activity upon discharge: activity as tolerated. Today is day 5 of COVID 19.  For the next 5 days if you are around people please wear a mask. After 10 days, there is no further COVID quarantine guidelines.     Diet    Follow this diet upon discharge: Regular Diet Adult       Significant Results and Procedures   Results for orders placed or performed during the hospital encounter of 02/21/23   XR Pelvis 1/2 Views    Narrative    EXAM: XR PELVIS 1/2 VIEWS  LOCATION: [facility]  DATE/TIME: 2/21/2023 11:19 PM    INDICATION: fall  COMPARISON: 02/20/2022      Impression    IMPRESSION: Numerous pelvic surgical clips. Partially imaged right hip arthroplasty. Left hip arthritic change. Pelvic ring appears intact. No displaced left hip fracture. Advanced degenerative change of the lower lumbar spine.   Head CT w/o contrast    Narrative    EXAM: CT HEAD W/O CONTRAST  LOCATION: [facility]  DATE/TIME: 2/21/2023 10:36 PM    INDICATION: Head injury  COMPARISON: MRI 8/11/2021  TECHNIQUE: Routine CT Head without IV contrast. Multiplanar reformats. Dose reduction techniques were used.    FINDINGS:  INTRACRANIAL CONTENTS: No intracranial hemorrhage, extraaxial collection, or mass effect.  No CT evidence of acute infarct. Mild presumed chronic small vessel ischemic changes. 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 Moderate generalized volume loss. No hydrocephalus.     VISUALIZED ORBITS/SINUSES/MASTOIDS: No intraorbital abnormality. No paranasal sinus mucosal disease. No middle ear or mastoid effusion.    BONES/SOFT TISSUES: No acute abnormality. Left suboccipital craniotomy. Embolization coils left middle meningeal artery.      Impression    IMPRESSION:  1.  No CT evidence for acute intracranial process.  2.  Chronic changes similar to prior.    XR Chest 1 View    Narrative    EXAM: XR CHEST 1 VIEW  LOCATION: [facility]  DATE/TIME: 2/21/2023 10:43 PM    INDICATION: fall  COMPARISON: 02/20/2022      Impression    IMPRESSION: Heart size at upper limits normal. Calcified aortic arch. No focal airspace consolidation. No visible pneumothorax. Old contour deformity of the right proximal humerus, partially imaged.       Discharge Medications   Current Discharge Medication List      CONTINUE these medications which have CHANGED    Details   lisinopril (ZESTRIL) 20 MG tablet Take 1 tablet (20 mg) by mouth At Bedtime  Qty: 30 tablet, Refills: 0    Associated Diagnoses: Benign essential hypertension         CONTINUE these medications which have NOT CHANGED    Details   cholecalciferol 25 MCG (1000 UT) TABS Take 1,000 Units by mouth daily      furosemide (LASIX) 20 MG tablet Take 40 mg by mouth daily      metoprolol succinate ER (TOPROL XL) 50 MG 24 hr tablet Take 50 mg by mouth every evening      potassium chloride ER (K-TAB) 20 MEQ CR tablet Take 1 tablet (20 mEq) by mouth daily    Associated Diagnoses: Acute HFrEF (heart failure with reduced ejection fraction) (H)      warfarin ANTICOAGULANT (COUMADIN) 4 MG tablet Warfarin 6mg every Monday,Thursday, Sunday. Warfarin 4mg all other days.           Allergies   Allergies   Allergen Reactions     Other Environmental Allergy      Pollen, Dust

## 2023-02-25 NOTE — PLAN OF CARE
PRIMARY DIAGNOSIS: Generalized Weakness  OUTPATIENT/OBSERVATION GOALS TO BE MET BEFORE DISCHARGE:  1. ADLs back to baseline: Yes    2. Activity and level of assistance: Up with standby assistance.    3. Pain status: Pain free.    4. Return to near baseline physical activity: Yes     Discharge Planner Nurse   Safe discharge environment identified: Yes  Barriers to discharge: No       Entered by: Velma Atkinson RN 02/25/2023 5:10 PM   Pt is A/Ox4. Denies pain, SOB, dizziness. Pt is Ax1 with walker and gaitbelt, and saline locked. Pt assessed PT and determined that he's ok to discharge home with outpatient PT and home care services. SW is following.  Please review provider order for any additional goals.   Nurse to notify provider when observation goals have been met and patient is ready for discharge.

## 2023-02-26 NOTE — PLAN OF CARE
Physical Therapy Discharge Summary    Reason for therapy discharge:    Discharged to home with home therapy.    Progress towards therapy goal(s). See goals on Care Plan in [facility] electronic health record for goal details.  Goals partially met.  Barriers to achieving goals:   discharge from facility.    Therapy recommendation(s):    Continued therapy is recommended.  Rationale/Recommendations:  Recommending HHPT to progress strength and functional mobility.

## 2023-02-27 ENCOUNTER — PATIENT OUTREACH (OUTPATIENT)
Dept: CARE COORDINATION | Facility: CLINIC | Age: 88
End: 2023-02-27
Payer: COMMERCIAL

## 2023-02-27 NOTE — PROGRESS NOTES
Clinic Care Coordination Contact  [facility]: Post-Discharge Note  SITUATION                                                      Admission:    Admission Date: 02/21/23   Reason for Admission: Fall  Generalized weakness COVID-19 infection Cardiomyopathy  HTN  Hx atrial flutter  Elevated troponin  Discharge:   Discharge Date: 02/25/23  Discharge Diagnosis: Fall  Generalized weakness COVID-19 infection Cardiomyopathy  HTN  Hx atrial flutter  Elevated troponin    BACKGROUND                                                      Per hospital discharge summary and inpatient provider notes:    Ciro Wells is a 88 year old male with PMH including DVT/PE on warfarin, paroxysmal atrial flutter, cardiomyopathy with most recent EF up to 60-65%, mild pulmonary HTN, HTN, CKD stage IIIa with baseline creatinine 1-1.1, anemia, and right ZACK who presents due to weakness and a fall.  He was walking in his house around 4 PM today when he tripped on his slippers and fell on his right side.  He denies any pain or injury from the fall.  He was weak and could not get up for a couple of hours.  He was unable to get up and he laid down.  He then was unable to get out of bed so EMS was called.  He was not using his walker like he normally does when he tripped and fell.  He does report some weakness in both of his legs over the past 2 to 3 weeks.  He says his spouse has COVID19 with symptoms for the last 3 days, but he has been feeling fine other than the weakness today.  He denies any fevers, chills, cough, shortness of breath, myalgias, headache, chest pain.  He says he takes his blood pressure medications at midnight which he is due for now.     History obtained from patient, medical record, and from Dr. Tate in the emergency department.  Blood pressure 190/66, heart rate 63, temperature 99.0  F, oxygen 97% on room air.  Creatinine 1.2 and BUN 24 otherwise BMP unremarkable.  Magnesium slightly low at 1.6.  LFTs within normal  "limits.  Blood glucose 115.  Troponin T 44 with repeat down to 42.  NT proBNP 1791.  CBC within normal limits.  COVID-19 PCR is positive.  Chest x-ray unremarkable, pelvis x-ray, noncontrast head CT unremarkable.  EKG shows NSR with PACs.  Spouse reports that she cannot take care of him at home due to her COVID-19 infection.  He is too weak to ambulate.  Admit to observation with PT and social work consultation.    ASSESSMENT      Discharge Assessment  How are you doing now that you are home?: \"Doing okay\"  How are your symptoms? (Red Flag symptoms escalate to triage hotline per guidelines): Improved  Do you feel your condition is stable enough to be safe at home until your provider visit?: Yes  Does the patient have their discharge instructions? : Yes  Does the patient have questions regarding their discharge instructions? : No  Were you started on any new medications or were there changes to any of your previous medications? : Yes  Does the patient have all of their medications?: Yes  Do you have questions regarding any of your medications? : No  Do you have all of your needed medical supplies or equipment (DME)?  (i.e. oxygen tank, CPAP, cane, etc.): Yes  Discharge follow-up appointment scheduled within 14 calendar days? : No  Is patient agreeable to assistance with scheduling? : No    Post-op ([facility] CTA Only)  If the patient had a surgery or procedure, do they have any questions for a nurse?: No    PLAN                                                      Outpatient Plan:    Follow up with primary care provider, Maximo Granados, within 7 days for   hospital follow- up.  The following labs/tests are recommended: Blood   pressure management, BMP recheck after increasing lisinopril.     No future appointments.      For any urgent concerns, please contact our 24 hour nurse triage line: [phone] ([phone])         ADAMARIS Wheeler  [phone]  [facility]    "

## 2023-03-09 ENCOUNTER — LAB REQUISITION (OUTPATIENT)
Dept: LAB | Facility: CLINIC | Age: 88
End: 2023-03-09
Payer: COMMERCIAL

## 2023-03-09 DIAGNOSIS — I48.0 PAROXYSMAL ATRIAL FIBRILLATION (H): ICD-10-CM

## 2023-03-09 LAB — INR PPP: 1.53 (ref 0.85–1.15)

## 2023-03-09 PROCEDURE — 85610 PROTHROMBIN TIME: CPT | Mod: ORL | Performed by: PHYSICIAN ASSISTANT

## 2023-07-23 ENCOUNTER — LAB REQUISITION (OUTPATIENT)
Dept: LAB | Facility: CLINIC | Age: 88
End: 2023-07-23
Payer: COMMERCIAL

## 2023-07-24 LAB
ANION GAP SERPL CALCULATED.3IONS-SCNC: 11 MMOL/L (ref 7–15)
BUN SERPL-MCNC: 28.4 MG/DL (ref 8–23)
CALCIUM SERPL-MCNC: 9.2 MG/DL (ref 8.8–10.2)
CHLORIDE SERPL-SCNC: 107 MMOL/L (ref 98–107)
CREAT SERPL-MCNC: 1.03 MG/DL (ref 0.67–1.17)
DEPRECATED HCO3 PLAS-SCNC: 24 MMOL/L (ref 22–29)
GFR SERPL CREATININE-BSD FRML MDRD: 70 ML/MIN/1.73M2
GLUCOSE SERPL-MCNC: 88 MG/DL (ref 70–99)
INR PPP: 2.21 (ref 0.85–1.15)
POTASSIUM SERPL-SCNC: 4.1 MMOL/L (ref 3.4–5.3)
SODIUM SERPL-SCNC: 142 MMOL/L (ref 136–145)

## 2023-07-24 PROCEDURE — P9603 ONE-WAY ALLOW PRORATED MILES: HCPCS | Performed by: NURSE PRACTITIONER

## 2023-07-24 PROCEDURE — 80048 BASIC METABOLIC PNL TOTAL CA: CPT | Performed by: NURSE PRACTITIONER

## 2023-07-24 PROCEDURE — 36415 COLL VENOUS BLD VENIPUNCTURE: CPT | Performed by: NURSE PRACTITIONER

## 2023-07-24 PROCEDURE — 85610 PROTHROMBIN TIME: CPT | Performed by: NURSE PRACTITIONER

## 2023-07-28 ENCOUNTER — LAB REQUISITION (OUTPATIENT)
Dept: LAB | Facility: CLINIC | Age: 88
End: 2023-07-28
Payer: COMMERCIAL

## 2023-07-28 DIAGNOSIS — R79.1 ABNORMAL COAGULATION PROFILE: ICD-10-CM

## 2023-07-31 LAB — INR PPP: 3.94 (ref 0.85–1.15)

## 2023-07-31 PROCEDURE — 85610 PROTHROMBIN TIME: CPT | Performed by: NURSE PRACTITIONER

## 2023-07-31 PROCEDURE — P9603 ONE-WAY ALLOW PRORATED MILES: HCPCS | Performed by: NURSE PRACTITIONER

## 2023-07-31 PROCEDURE — 36415 COLL VENOUS BLD VENIPUNCTURE: CPT | Performed by: NURSE PRACTITIONER
